# Patient Record
Sex: MALE | Race: WHITE | Employment: OTHER | URBAN - METROPOLITAN AREA
[De-identification: names, ages, dates, MRNs, and addresses within clinical notes are randomized per-mention and may not be internally consistent; named-entity substitution may affect disease eponyms.]

---

## 2017-11-16 PROBLEM — E11.3393 MODERATE NONPROLIFERATIVE DIABETIC RETINOPATHY OF BOTH EYES WITHOUT MACULAR EDEMA ASSOCIATED WITH TYPE 2 DIABETES MELLITUS (HCC): Status: ACTIVE | Noted: 2017-11-16

## 2019-03-21 PROBLEM — E11.65 UNCONTROLLED TYPE 2 DIABETES MELLITUS WITH HYPERGLYCEMIA (HCC): Status: ACTIVE | Noted: 2019-03-21

## 2019-10-09 PROCEDURE — 84156 ASSAY OF PROTEIN URINE: CPT | Performed by: INTERNAL MEDICINE

## 2019-10-09 PROCEDURE — 82570 ASSAY OF URINE CREATININE: CPT | Performed by: INTERNAL MEDICINE

## 2021-03-09 PROBLEM — J81.0 ACUTE PULMONARY EDEMA (HCC): Status: ACTIVE | Noted: 2021-03-09

## 2021-03-09 PROBLEM — Z99.2 ANEMIA DUE TO CHRONIC KIDNEY DISEASE, ON CHRONIC DIALYSIS (HCC): Status: ACTIVE | Noted: 2021-03-09

## 2021-03-09 PROBLEM — N18.9 ACUTE RENAL FAILURE SUPERIMPOSED ON CHRONIC KIDNEY DISEASE (HCC): Status: ACTIVE | Noted: 2021-03-01

## 2021-03-09 PROBLEM — Z99.2 ESRD (END STAGE RENAL DISEASE) ON DIALYSIS (HCC): Status: ACTIVE | Noted: 2021-03-09

## 2021-03-09 PROBLEM — D63.1 ANEMIA DUE TO CHRONIC KIDNEY DISEASE, ON CHRONIC DIALYSIS (HCC): Status: ACTIVE | Noted: 2021-03-09

## 2021-03-09 PROBLEM — N18.6 ESRD (END STAGE RENAL DISEASE) ON DIALYSIS (HCC): Status: ACTIVE | Noted: 2021-03-09

## 2021-03-09 PROBLEM — N17.9 ACUTE RENAL FAILURE SUPERIMPOSED ON CHRONIC KIDNEY DISEASE (HCC): Status: ACTIVE | Noted: 2021-03-01

## 2021-03-09 PROBLEM — N18.6 ANEMIA DUE TO CHRONIC KIDNEY DISEASE, ON CHRONIC DIALYSIS (HCC): Status: ACTIVE | Noted: 2021-03-09

## 2021-04-22 PROBLEM — I63.81 BASAL GANGLIA STROKE (HCC): Status: ACTIVE | Noted: 2021-04-19

## 2021-10-05 ENCOUNTER — LAB ENCOUNTER (OUTPATIENT)
Dept: LAB | Facility: HOSPITAL | Age: 61
End: 2021-10-05
Attending: SURGERY
Payer: COMMERCIAL

## 2021-10-05 DIAGNOSIS — N18.6 ESRD (END STAGE RENAL DISEASE) (HCC): ICD-10-CM

## 2021-10-08 ENCOUNTER — HOSPITAL ENCOUNTER (OUTPATIENT)
Dept: INTERVENTIONAL RADIOLOGY/VASCULAR | Facility: HOSPITAL | Age: 61
Discharge: HOME | End: 2021-10-08
Attending: SURGERY | Admitting: SURGERY
Payer: COMMERCIAL

## 2021-10-08 VITALS
RESPIRATION RATE: 14 BRPM | WEIGHT: 165 LBS | BODY MASS INDEX: 25 KG/M2 | DIASTOLIC BLOOD PRESSURE: 54 MMHG | HEART RATE: 71 BPM | SYSTOLIC BLOOD PRESSURE: 134 MMHG | OXYGEN SATURATION: 100 % | TEMPERATURE: 98 F

## 2021-10-08 DIAGNOSIS — N18.6 ESRD (END STAGE RENAL DISEASE) (HCC): Primary | ICD-10-CM

## 2021-10-08 PROCEDURE — 99153 MOD SED SAME PHYS/QHP EA: CPT

## 2021-10-08 PROCEDURE — 82962 GLUCOSE BLOOD TEST: CPT

## 2021-10-08 PROCEDURE — B50W1ZZ PLAIN RADIOGRAPHY OF DIALYSIS SHUNT/FISTULA USING LOW OSMOLAR CONTRAST: ICD-10-PCS | Performed by: SURGERY

## 2021-10-08 PROCEDURE — 99152 MOD SED SAME PHYS/QHP 5/>YRS: CPT

## 2021-10-08 PROCEDURE — 36902 INTRO CATH DIALYSIS CIRCUIT: CPT

## 2021-10-08 PROCEDURE — 76937 US GUIDE VASCULAR ACCESS: CPT

## 2021-10-08 PROCEDURE — 037Y3ZZ DILATION OF UPPER ARTERY, PERCUTANEOUS APPROACH: ICD-10-PCS | Performed by: SURGERY

## 2021-10-08 RX ORDER — LIDOCAINE HYDROCHLORIDE 10 MG/ML
INJECTION, SOLUTION INFILTRATION; PERINEURAL
Status: COMPLETED
Start: 2021-10-08 | End: 2021-10-08

## 2021-10-08 RX ORDER — HEPARIN SODIUM 5000 [USP'U]/ML
INJECTION, SOLUTION INTRAVENOUS; SUBCUTANEOUS
Status: COMPLETED
Start: 2021-10-08 | End: 2021-10-08

## 2021-10-08 RX ORDER — SODIUM CHLORIDE 9 MG/ML
INJECTION, SOLUTION INTRAVENOUS CONTINUOUS
Status: DISCONTINUED | OUTPATIENT
Start: 2021-10-08 | End: 2021-10-08

## 2021-10-08 RX ORDER — MIDAZOLAM HYDROCHLORIDE 1 MG/ML
INJECTION INTRAMUSCULAR; INTRAVENOUS
Status: COMPLETED
Start: 2021-10-08 | End: 2021-10-08

## 2021-10-08 RX ORDER — CLOPIDOGREL BISULFATE 75 MG/1
TABLET ORAL
Status: COMPLETED
Start: 2021-10-08 | End: 2021-10-08

## 2021-10-08 RX ORDER — NITROGLYCERIN 20 MG/100ML
INJECTION INTRAVENOUS
Status: COMPLETED
Start: 2021-10-08 | End: 2021-10-08

## 2021-10-08 RX ORDER — CLOPIDOGREL BISULFATE 75 MG/1
75 TABLET ORAL DAILY
Qty: 30 TABLET | Refills: 0 | Status: SHIPPED | OUTPATIENT
Start: 2021-10-08

## 2021-10-08 RX ADMIN — SODIUM CHLORIDE: 9 INJECTION, SOLUTION INTRAVENOUS at 08:45:00

## 2021-10-08 NOTE — H&P
Vascular Surgery  New Patient Consultation     Name: Masha Rudd  MRN: [id number]  : 10/27/1960     Referring Provider: Carlene Ramirez     CC: dialysis access malfunction     HPI:  63year old   male  with history of  HTN, HL DM who presented for AV fi Oral Tab, Take 1 tablet (5 mg total) by mouth daily. , Disp: 90 tablet, Rfl: 1  carvedilol 25 MG Oral Tab, Take 1 tablet (25 mg total) by mouth 2 (two) times daily. , Disp: 180 tablet, Rfl: 1  lisinopril 40 MG Oral Tab, Take 1 tablet (40 mg total) by mouth d since quittin.1      Smokeless tobacco: Never Used      Tobacco comment: 14 cigarettes a day    Substance and Sexual Activity      Alcohol use:  Yes        Alcohol/week: 0.0 standard drinks      Drug use: No      Sexual activity: Not Currently       for a right upper extremity fistulogram with the possibility for intervention. I discussed the risk and benefits. He affirms his understanding and agrees to proceed. Consent obtained. Proceed to Cath Lab as above.   This was communicated with his brothe

## 2021-10-08 NOTE — OPERATIVE REPORT
Vascular Surgery Op Note  Patients Name:  Bright Tubbs   Operating Physician: Marlene Fontaine MD  CSN:    [id number]                                                         FLO:  JAY  MRN:   [id number]   Buster Morris the patient was brought to the Cath Lab and placed in supine position. Sedation was initiated without any issues. The patient was subsequently prepped and draped in the usual sterile fashion. Timeout was performed.   With the use the ultrasound the right

## 2021-10-08 NOTE — PROGRESS NOTES
Pt is able to sit up and ambulate without difficulty. Pt's vital signs are stable. Right arm/fistula procedural access site is dry and intact with no signs and symptoms of bleeding or hematoma. Pt tolerated food/fluids.  Dr. Stephane Vera spoke to pt/family post

## 2022-03-24 PROBLEM — N17.9 ACUTE RENAL FAILURE SUPERIMPOSED ON CHRONIC KIDNEY DISEASE (HCC): Status: RESOLVED | Noted: 2021-03-01 | Resolved: 2022-03-24

## 2022-03-24 PROBLEM — N18.9 ACUTE RENAL FAILURE SUPERIMPOSED ON CHRONIC KIDNEY DISEASE (HCC): Status: RESOLVED | Noted: 2021-03-01 | Resolved: 2022-03-24

## 2022-03-24 PROBLEM — J81.0 ACUTE PULMONARY EDEMA (HCC): Status: RESOLVED | Noted: 2021-03-09 | Resolved: 2022-03-24

## 2025-01-04 ENCOUNTER — HOSPITAL ENCOUNTER (INPATIENT)
Facility: HOSPITAL | Age: 65
LOS: 6 days | Discharge: HOSPICE | End: 2025-01-10
Attending: STUDENT IN AN ORGANIZED HEALTH CARE EDUCATION/TRAINING PROGRAM | Admitting: STUDENT IN AN ORGANIZED HEALTH CARE EDUCATION/TRAINING PROGRAM
Payer: MEDICARE

## 2025-01-04 ENCOUNTER — APPOINTMENT (OUTPATIENT)
Dept: CT IMAGING | Facility: HOSPITAL | Age: 65
End: 2025-01-04
Attending: STUDENT IN AN ORGANIZED HEALTH CARE EDUCATION/TRAINING PROGRAM
Payer: MEDICARE

## 2025-01-04 ENCOUNTER — APPOINTMENT (OUTPATIENT)
Dept: GENERAL RADIOLOGY | Facility: HOSPITAL | Age: 65
End: 2025-01-04
Attending: STUDENT IN AN ORGANIZED HEALTH CARE EDUCATION/TRAINING PROGRAM
Payer: MEDICARE

## 2025-01-04 DIAGNOSIS — R53.1 WEAKNESS GENERALIZED: ICD-10-CM

## 2025-01-04 DIAGNOSIS — R62.7 FTT (FAILURE TO THRIVE) IN ADULT: ICD-10-CM

## 2025-01-04 DIAGNOSIS — G93.49: ICD-10-CM

## 2025-01-04 DIAGNOSIS — B97.89: ICD-10-CM

## 2025-01-04 DIAGNOSIS — R94.01 EEG ABNORMALITY WITHOUT SEIZURE: Primary | ICD-10-CM

## 2025-01-04 PROBLEM — G93.40 ENCEPHALOPATHY: Status: ACTIVE | Noted: 2025-01-04

## 2025-01-04 LAB
ALBUMIN SERPL-MCNC: 3.7 G/DL (ref 3.2–4.8)
ALBUMIN/GLOB SERPL: 1.2 {RATIO} (ref 1–2)
ALP LIVER SERPL-CCNC: 136 U/L
ALT SERPL-CCNC: <7 U/L
ANION GAP SERPL CALC-SCNC: 9 MMOL/L (ref 0–18)
AST SERPL-CCNC: 17 U/L (ref ?–34)
BASOPHILS # BLD AUTO: 0.04 X10(3) UL (ref 0–0.2)
BASOPHILS NFR BLD AUTO: 0.6 %
BILIRUB SERPL-MCNC: 0.2 MG/DL (ref 0.2–1.1)
BUN BLD-MCNC: 31 MG/DL (ref 9–23)
CALCIUM BLD-MCNC: 9.7 MG/DL (ref 8.7–10.4)
CHLORIDE SERPL-SCNC: 92 MMOL/L (ref 98–112)
CO2 SERPL-SCNC: 31 MMOL/L (ref 21–32)
CREAT BLD-MCNC: 5.36 MG/DL
EGFRCR SERPLBLD CKD-EPI 2021: 11 ML/MIN/1.73M2 (ref 60–?)
EOSINOPHIL # BLD AUTO: 0.08 X10(3) UL (ref 0–0.7)
EOSINOPHIL NFR BLD AUTO: 1.1 %
ERYTHROCYTE [DISTWIDTH] IN BLOOD BY AUTOMATED COUNT: 15.3 %
FLUAV + FLUBV RNA SPEC NAA+PROBE: NEGATIVE
FLUAV + FLUBV RNA SPEC NAA+PROBE: NEGATIVE
GLOBULIN PLAS-MCNC: 3 G/DL (ref 2–3.5)
GLUCOSE BLD-MCNC: 151 MG/DL (ref 70–99)
HCT VFR BLD AUTO: 23.7 %
HGB BLD-MCNC: 8.3 G/DL
IMM GRANULOCYTES # BLD AUTO: 0.03 X10(3) UL (ref 0–1)
IMM GRANULOCYTES NFR BLD: 0.4 %
LACTATE SERPL-SCNC: 0.9 MMOL/L (ref 0.5–2)
LYMPHOCYTES # BLD AUTO: 0.95 X10(3) UL (ref 1–4)
LYMPHOCYTES NFR BLD AUTO: 13.6 %
MAGNESIUM SERPL-MCNC: 2.2 MG/DL (ref 1.6–2.6)
MCH RBC QN AUTO: 32.2 PG (ref 26–34)
MCHC RBC AUTO-ENTMCNC: 35 G/DL (ref 31–37)
MCV RBC AUTO: 91.9 FL
MONOCYTES # BLD AUTO: 0.55 X10(3) UL (ref 0.1–1)
MONOCYTES NFR BLD AUTO: 7.9 %
NEUTROPHILS # BLD AUTO: 5.35 X10 (3) UL (ref 1.5–7.7)
NEUTROPHILS # BLD AUTO: 5.35 X10(3) UL (ref 1.5–7.7)
NEUTROPHILS NFR BLD AUTO: 76.4 %
OSMOLALITY SERPL CALC.SUM OF ELEC: 283 MOSM/KG (ref 275–295)
PHOSPHATE SERPL-MCNC: 3.2 MG/DL (ref 2.4–5.1)
PLATELET # BLD AUTO: 237 10(3)UL (ref 150–450)
POTASSIUM SERPL-SCNC: 4.6 MMOL/L (ref 3.5–5.1)
PROT SERPL-MCNC: 6.7 G/DL (ref 5.7–8.2)
RBC # BLD AUTO: 2.58 X10(6)UL
RSV RNA SPEC NAA+PROBE: POSITIVE
SARS-COV-2 RNA RESP QL NAA+PROBE: NOT DETECTED
SODIUM SERPL-SCNC: 132 MMOL/L (ref 136–145)
WBC # BLD AUTO: 7 X10(3) UL (ref 4–11)

## 2025-01-04 PROCEDURE — 71045 X-RAY EXAM CHEST 1 VIEW: CPT | Performed by: STUDENT IN AN ORGANIZED HEALTH CARE EDUCATION/TRAINING PROGRAM

## 2025-01-04 PROCEDURE — 70450 CT HEAD/BRAIN W/O DYE: CPT | Performed by: STUDENT IN AN ORGANIZED HEALTH CARE EDUCATION/TRAINING PROGRAM

## 2025-01-04 RX ORDER — HEPARIN SODIUM 5000 [USP'U]/ML
5000 INJECTION, SOLUTION INTRAVENOUS; SUBCUTANEOUS EVERY 12 HOURS SCHEDULED
Status: DISCONTINUED | OUTPATIENT
Start: 2025-01-04 | End: 2025-01-10

## 2025-01-04 RX ORDER — ONDANSETRON 2 MG/ML
4 INJECTION INTRAMUSCULAR; INTRAVENOUS ONCE
Status: COMPLETED | OUTPATIENT
Start: 2025-01-04 | End: 2025-01-04

## 2025-01-04 RX ORDER — ACETAMINOPHEN 500 MG
500 TABLET ORAL EVERY 4 HOURS PRN
Status: DISCONTINUED | OUTPATIENT
Start: 2025-01-04 | End: 2025-01-10

## 2025-01-04 RX ORDER — ONDANSETRON 2 MG/ML
2 INJECTION INTRAMUSCULAR; INTRAVENOUS ONCE
Status: COMPLETED | OUTPATIENT
Start: 2025-01-04 | End: 2025-01-04

## 2025-01-04 RX ORDER — ONDANSETRON 2 MG/ML
INJECTION INTRAMUSCULAR; INTRAVENOUS
Status: COMPLETED
Start: 2025-01-04 | End: 2025-01-04

## 2025-01-04 NOTE — ED INITIAL ASSESSMENT (HPI)
Patient arrived via EMS from [facility] for n/v/d/fever. Medics said they suctioned him and gave 4 mg zofran.

## 2025-01-05 LAB
ALBUMIN SERPL-MCNC: 3.7 G/DL (ref 3.2–4.8)
ALBUMIN/GLOB SERPL: 1.3 {RATIO} (ref 1–2)
ALP LIVER SERPL-CCNC: 141 U/L
ALT SERPL-CCNC: <7 U/L
ANION GAP SERPL CALC-SCNC: 9 MMOL/L (ref 0–18)
AST SERPL-CCNC: 13 U/L (ref ?–34)
ATRIAL RATE: 59 BPM
BILIRUB SERPL-MCNC: 0.3 MG/DL (ref 0.2–1.1)
BILIRUB UR QL STRIP.AUTO: NEGATIVE
BUN BLD-MCNC: 37 MG/DL (ref 9–23)
CALCIUM BLD-MCNC: 9.8 MG/DL (ref 8.7–10.4)
CHLORIDE SERPL-SCNC: 94 MMOL/L (ref 98–112)
CO2 SERPL-SCNC: 30 MMOL/L (ref 21–32)
COLOR UR AUTO: YELLOW
CREAT BLD-MCNC: 6.17 MG/DL
EGFRCR SERPLBLD CKD-EPI 2021: 9 ML/MIN/1.73M2 (ref 60–?)
ERYTHROCYTE [DISTWIDTH] IN BLOOD BY AUTOMATED COUNT: 15.6 %
GLOBULIN PLAS-MCNC: 2.9 G/DL (ref 2–3.5)
GLUCOSE BLD-MCNC: 127 MG/DL (ref 70–99)
GLUCOSE BLD-MCNC: 139 MG/DL (ref 70–99)
GLUCOSE BLD-MCNC: 145 MG/DL (ref 70–99)
GLUCOSE BLD-MCNC: 147 MG/DL (ref 70–99)
GLUCOSE BLD-MCNC: 163 MG/DL (ref 70–99)
GLUCOSE UR STRIP.AUTO-MCNC: 100 MG/DL
HCT VFR BLD AUTO: 25.1 %
HGB BLD-MCNC: 8.6 G/DL
KETONES UR STRIP.AUTO-MCNC: NEGATIVE MG/DL
LEUKOCYTE ESTERASE UR QL STRIP.AUTO: NEGATIVE
MCH RBC QN AUTO: 31.9 PG (ref 26–34)
MCHC RBC AUTO-ENTMCNC: 34.3 G/DL (ref 31–37)
MCV RBC AUTO: 93 FL
NITRITE UR QL STRIP.AUTO: NEGATIVE
OSMOLALITY SERPL CALC.SUM OF ELEC: 286 MOSM/KG (ref 275–295)
P AXIS: 42 DEGREES
P-R INTERVAL: 196 MS
PH UR STRIP.AUTO: 8.5 [PH] (ref 5–8)
PLATELET # BLD AUTO: 233 10(3)UL (ref 150–450)
POTASSIUM SERPL-SCNC: 4.4 MMOL/L (ref 3.5–5.1)
PROT SERPL-MCNC: 6.6 G/DL (ref 5.7–8.2)
PROT UR STRIP.AUTO-MCNC: 300 MG/DL
Q-T INTERVAL: 426 MS
QRS DURATION: 104 MS
QTC CALCULATION (BEZET): 421 MS
R AXIS: -2 DEGREES
RBC # BLD AUTO: 2.7 X10(6)UL
RBC UR QL AUTO: NEGATIVE
SODIUM SERPL-SCNC: 133 MMOL/L (ref 136–145)
SP GR UR STRIP.AUTO: 1.01 (ref 1–1.03)
T AXIS: 37 DEGREES
UROBILINOGEN UR STRIP.AUTO-MCNC: NORMAL MG/DL
VENTRICULAR RATE: 59 BPM
WBC # BLD AUTO: 13.4 X10(3) UL (ref 4–11)

## 2025-01-05 RX ORDER — ATORVASTATIN CALCIUM 80 MG/1
80 TABLET, FILM COATED ORAL NIGHTLY
Status: DISCONTINUED | OUTPATIENT
Start: 2025-01-05 | End: 2025-01-10

## 2025-01-05 RX ORDER — CLOPIDOGREL BISULFATE 75 MG/1
75 TABLET ORAL DAILY
Status: DISCONTINUED | OUTPATIENT
Start: 2025-01-05 | End: 2025-01-10

## 2025-01-05 RX ORDER — ASPIRIN 81 MG/1
81 TABLET ORAL DAILY
Status: DISCONTINUED | OUTPATIENT
Start: 2025-01-05 | End: 2025-01-10

## 2025-01-05 RX ORDER — CALCITRIOL 0.25 UG/1
0.25 CAPSULE, LIQUID FILLED ORAL
Status: DISCONTINUED | OUTPATIENT
Start: 2025-01-06 | End: 2025-01-10

## 2025-01-05 RX ORDER — ALBUMIN (HUMAN) 12.5 G/50ML
25 SOLUTION INTRAVENOUS
Status: DISPENSED | OUTPATIENT
Start: 2025-01-05 | End: 2025-01-07

## 2025-01-05 NOTE — ED QUICK NOTES
Orders for admission, patient is aware of plan and ready to go upstairs. Any questions, please call ED RN Eldon at extension [phone].     Patient Covid vaccination status: Fully vaccinated     COVID Test Ordered in ED: SARS-CoV-2/Flu A and B/RSV by PCR (GeneXpert)    COVID Suspicion at Admission: N/A    Running Infusions:  None    Mental Status/LOC at time of transport: A&O x 0    Other pertinent information:   CIWA score: N/A   NIH score:  N/A

## 2025-01-05 NOTE — ED QUICK NOTES
Rounding Completed    Plan of Care reviewed. Waiting for further orders.  Elimination needs assessed.  Vitals rechecked and recorded, family at bedside.  A&O x O    Bed is locked and in lowest position. Call light within reach.

## 2025-01-05 NOTE — H&P
[facility] Hospitalist H&P    No chief complaint on file.     PCP: April Simmons MD    History of Present Illness: Patient is a 64 year old male with history of ESRD on HD, hypertension, hyperlipidemia, type 2 diabetes, and CVA who presented for altered mentation.  Reportedly, patient has not been eating or following commands per baseline.  Reportedly, due to CVA complication he is bedbound and nonverbal at baseline per patients POA.  He does open eyes to verbal cues.     Patient hypertensive on presentation, satting well on room air.  Labs with leukocytosis and renal function consistent with ESRD.  RSV positive.  CXR without acute infiltrates and CT head without acute intracranial process.  Patient admitted for further management.    Medical History:  Past Medical History:    Acute pulmonary edema (HCC)    DIABETES    HTN (hypertension), benign    HYPERLIPIDEMIA    Stroke (HCC)    basil ganglia stroke    Tobacco use disorder      Past Surgical History:   Procedure Laterality Date    Cataract Right 04/12/2018    Tej Trent IOL    Cataract Left 05/10/2018    Tej Trent IOL    Other  1990    left foot surgery      Social History     Tobacco Use    Smoking status: Former     Current packs/day: 0.00     Types: Cigarettes     Quit date: 3/1/2021     Years since quitting: 3.8    Smokeless tobacco: Never    Tobacco comments:     14 cigarettes a day   Substance Use Topics    Alcohol use: Yes     Alcohol/week: 0.0 standard drinks of alcohol      Family History   Problem Relation Age of Onset    Diabetes Father     Hypertension Father     Diabetes Mother        Allergies[1]     Review of Systems  Comprehensive ROS reviewed and negative except for what is stated in HPI.      OBJECTIVE:  /65 (BP Location: Radial)   Pulse 68   Temp 99.5 °F (37.5 °C) (Axillary)   Resp 16   Wt 169 lb 12.1 oz (77 kg)   SpO2 95%   BMI 25.81 kg/m²   General: No apparent distress.   HEENT:  EOMI, PERRLA.  Pulm: Clear breath  sounds bilaterally.  Normal respiratory effort.  CV: Regular rate and rhythm, no murmur.   Abd: Soft, nontender, nondistended.  MSK: No obvious deformities.    Skin: No lesions or rashes.  Neuro: Nonverbal, does not follow commands.    LABS:   Lab Results   Component Value Date    WBC 13.4 01/05/2025    HGB 8.6 01/05/2025    HCT 25.1 01/05/2025    .0 01/05/2025    CREATSERUM 6.17 01/05/2025    BUN 37 01/05/2025     01/05/2025    K 4.4 01/05/2025    CL 94 01/05/2025    CO2 30.0 01/05/2025     01/05/2025    CA 9.8 01/05/2025    ALB 3.7 01/05/2025    ALKPHO 141 01/05/2025    BILT 0.3 01/05/2025    TP 6.6 01/05/2025    AST 13 01/05/2025    ALT <7 01/05/2025    MG 2.2 01/04/2025    PHOS 3.2 01/04/2025    PGLU 147 01/05/2025       All lab work and imaging personally reviewed.    Assessment/ Plan:     #RSV  #Acute hypoxia with mucus plugging  #Leukocytosis   #Acute metabolic encephalopathy  -CXR without infiltrates.  CT head negative.  -Supportive therapy,  nebs, bronchial hygiene.  -Will maintain patient NPO for now due to mentation.    #Hypertension  #Hyperlipidemia  #ESRD on HD  #Type 2 diabetes  -Holding home PO until mentation improves.  Discussed with bedside RN.  -Sliding scale coverage as patient is with minimal oral intake.  -Nephrology on consult for HD management    #Advanced care planning  -Discussed with patients POA/ Maria Del Rosario. Updated code status to DNAR / DNI.    DVT ppx: Heparin subcu  Diet: NPO   Code status: DNAR/ DNI  Disposition: Likely return to rehab    DO Fátima Sutton [facility]       [1] No Known Allergies

## 2025-01-05 NOTE — ED QUICK NOTES
Rounding Completed    Plan of Care reviewed. Waiting for further orders.  Elimination needs assessed.  Vitals rechecked and recorded, patient A&O x 0 per report from previous shift  Family at bedside    Bed is locked and in lowest position. Call light within reach.

## 2025-01-05 NOTE — ED PROVIDER NOTES
History   No chief complaint on file.      HPI    64 year old male with history of hypertension, hyperlipidemia, diabetes, basal ganglia CVA complicated by communication deficit, bedbound, ESRD on hemodialysis 4 days a week presents from facility for assessment.  For the past couple days patient has been minimally responsive, not eating or drinking, not opening his eyes and not saying anything.  Noted fever at the facility today.  No reports of cough or fevers or vomiting.  Patient unable to give any history.  History per family at bedside.  Family reports that at baseline he is able to speak a few words and can eat and drink, has not been able to the past couple days.           Past Medical History:    Acute pulmonary edema (HCC)    DIABETES    HTN (hypertension), benign    HYPERLIPIDEMIA    Stroke (HCC)    basil ganglia stroke    Tobacco use disorder       Past Surgical History:   Procedure Laterality Date    Cataract Right 04/12/2018    Tej Trent IOL    Cataract Left 05/10/2018    Tej Trent IOL    Other  1990    left foot surgery       Social History     Socioeconomic History    Marital status: Single    Number of children: 0   Occupational History    Occupation:    Tobacco Use    Smoking status: Former     Current packs/day: 0.00     Types: Cigarettes     Quit date: 3/1/2021     Years since quitting: 3.8    Smokeless tobacco: Never    Tobacco comments:     14 cigarettes a day   Substance and Sexual Activity    Alcohol use: Yes     Alcohol/week: 0.0 standard drinks of alcohol    Drug use: No    Sexual activity: Not Currently   Social History Narrative    . Single.                   Physical Exam     ED Triage Vitals [01/04/25 1719]   /62   Pulse 60   Resp 18   Temp 99.6 °F (37.6 °C)   Temp src Rectal   SpO2 100 %   O2 Device None (Room air)       Physical Exam  Constitutional:       Appearance: He is ill-appearing.      Comments: Will intermittently move about to pain  stimuli   HENT:      Head: Normocephalic and atraumatic.      Mouth/Throat:      Mouth: Mucous membranes are dry.   Eyes:      Extraocular Movements: Extraocular movements intact.   Cardiovascular:      Rate and Rhythm: Normal rate and regular rhythm.      Pulses: Normal pulses.   Pulmonary:      Effort: Pulmonary effort is normal. No respiratory distress.      Comments: Coarse breath sounds bilaterally  Musculoskeletal:      Cervical back: Normal range of motion.              ED Course     Labs Reviewed   COMP METABOLIC PANEL (14) - Abnormal; Notable for the following components:       Result Value    Glucose 151 (*)     Sodium 132 (*)     Chloride 92 (*)     BUN 31 (*)     Creatinine 5.36 (*)     eGFR-Cr 11 (*)     ALT <7 (*)     Alkaline Phosphatase 136 (*)     All other components within normal limits   CBC WITH DIFFERENTIAL WITH PLATELET - Abnormal; Notable for the following components:    RBC 2.58 (*)     HGB 8.3 (*)     HCT 23.7 (*)     Lymphocyte Absolute 0.95 (*)     All other components within normal limits   SARS-COV-2/FLU A AND B/RSV BY PCR (GENEXPERT) - Abnormal; Notable for the following components:    RSV by PCR Positive (*)     All other components within normal limits    Narrative:     This test is intended for the qualitative detection and differentiation of SARS-CoV-2, influenza A, influenza B, and respiratory syncytial virus (RSV) viral RNA in nasopharyngeal or nares swabs from individuals suspected of respiratory viral infection consistent with COVID-19 by their healthcare provider. Signs and symptoms of respiratory viral infection due to SARS-CoV-2, influenza, and RSV can be similar.    Test performed using the Xpert Xpress SARS-CoV-2/FLU/RSV (real time RT-PCR)  assay on the [employer] instrument, [employer], [place] [address].   This test is being used under the Food and Drug Administration's Emergency Use Authorization.    The authorized Fact Sheet for Healthcare Providers for this assay is  available upon request from the laboratory.   LACTIC ACID, PLASMA - Normal   MAGNESIUM - Normal   PHOSPHORUS - Normal   RAINBOW DRAW LAVENDER   RAINBOW DRAW LIGHT GREEN   RAINBOW DRAW BLUE   BLOOD CULTURE   BLOOD CULTURE     XR CHEST AP PORTABLE  (CPT=71045)    Result Date: 1/4/2025  CONCLUSION:  Normal cardiac and mediastinal contours.  No pulmonary edema or focal airspace consolidation.  The pleural spaces are clear.   LOCATION:  [facility]      Dictated by (CST): Alec Oliva MD on 1/04/2025 at 7:08 PM     Finalized by (CST): Alec Oliva MD on 1/04/2025 at 7:09 PM       CT BRAIN OR HEAD (CPT=70450)    Result Date: 1/4/2025  CONCLUSION:   No acute intracranial process identified.    LOCATION:  [facility]   Dictated by (CST): Alec Oliva MD on 1/04/2025 at 6:55 PM     Finalized by (CST): Alec Oliva MD on 1/04/2025 at 6:58 PM            MDM     Vitals:    01/04/25 1719 01/04/25 1900 01/04/25 1922 01/04/25 2018   BP: 153/62 148/54 (!) 165/56 148/59   Pulse: 60 54 56 55   Resp: 18 11 15 14   Temp: 99.6 °F (37.6 °C)      TempSrc: Rectal      SpO2: 100% 100% 100% 100%   Weight: 77 kg          Altered mentation, failure to thrive.  Poor intake given patient is minimally responsive on examination here.  No immediate airway concerns but will monitor closely.  Hemodynamically reassuring at this time.  Concern for infectious versus metabolic encephalopathy.    ED Course as of 01/04/25 2057  ------------------------------------------------------------  Time: 01/04 1902  Comment: My interpretation of CT head without hemorrhage, confirmed by radiology.  ------------------------------------------------------------  Time: 01/04 1947  Comment: Elevated creatinine consistent with ESRD, reassuring potassium and no acidosis.  Lactate is reassuring, magnesium within normal limits.  RSV is positive.  ------------------------------------------------------------  Time: 01/04 1947  Comment: Chest x-ray is  clear.  ------------------------------------------------------------  Time: 01/04 1955  Comment: No acute intervention at this time, patient will need to be monitored, given his clinical appearance will admit for further observation and care.         Disposition and Plan     Clinical Impression:  1. Encephalopathy due to other virus    2. Weakness generalized    3. FTT (failure to thrive) in adult        Disposition:  Admit    Follow-up:  No follow-up provider specified.    Medications Prescribed:  Current Discharge Medication List          Hospital Problems       Present on Admission  Date Reviewed: 3/24/2022            ICD-10-CM Noted POA    Encephalopathy G93.40 1/4/2025 Unknown

## 2025-01-05 NOTE — SPIRITUAL CARE NOTE
Spiritual Care Visit Note    Patient Name: Bg Watkins Date of Spiritual Care Visit: 25   : 10/27/1960 Primary Dx: Encephalopathy due to other virus       Referred By: Referral From: Nurse    Spiritual Care Taxonomy:    Intended Effects: Mirella affirmation    Methods: Assist with spiritual/Rastafari practices;Collaborate with care team member;Offer spiritual/Rastafari support;Offer emotional support;Encourage sharing of feelings    Interventions: Acknowledge response to difficult experience;Active listening;Connect someone with their mirella community/clergy;Explain  role;Identify supportive relationship(s);[place]    Visit Type/Summary:     - Spiritual Care: Responded to a request for spiritual care and assessed the patient for spiritual care needs. Consulted with RN prior to visit. Visited with patients sisters at bedside. Offered empathic listening and emotional support. Family expressed appreciation for  visit. Provided information regarding how to contact Spiritual Care and left a Spiritual Care information card. Provided support for Patient's spiritual/Rastafari requests. Coordinated  visit for Sacrament of the Sick.  remains available for follow up.    Spiritual Care support can be requested via an Epic consult. For urgent/immediate needs, please contact the On Call  at: [facility]: ext [phone]    Rev Jada Carranza MA  Chaplain Resident  [phone]

## 2025-01-05 NOTE — PLAN OF CARE
NURSING ADMISSION NOTE    Patient admitted via Cart  Oriented to room.  Safety precautions initiated.  Bed in low position.  Call light in reach.    Pt received from ER. Pt from [facility], [facility]. Hx of CVA w/ communication deficit (not really verbal per family, baseline). On arrival, pt opens eyes, nonverbal. N/V. Thick secretions. Pt unable to cough up secretions. Suction prn. Aspiration precautions. VSS on RA. Afebrile. MD updated about respiratory status. UA sent. R arm precautions d/t R AVF.     Fall risk protocol followed, call light within reach.

## 2025-01-05 NOTE — SPIRITUAL CARE NOTE
Spiritual Care Visit Note    Patient Name: Bg Watkins Date of Spiritual Care Visit: 25   : 10/27/1960 Primary Dx: Encephalopathy due to other virus       Referred By: Referral From: Nurse    Spiritual Care Taxonomy:    Intended Effects: Mirella affirmation    Methods: Assist with spiritual/Buddhism practices    Interventions: Connect someone with their mirella community/clergy    Visit Type/Summary:     - Spiritual Care: Provided support for Patient's spiritual/Buddhism requests. Coordinated  visit for Sacrament of the Sick. Father Gregorio from [place] anointed patient.  remains available for follow up.    Spiritual Care support can be requested via an Epic consult. For urgent/immediate needs, please contact the On Call  at: [facility]: ext [phone]    Rev. Jada Carranza MA  Chaplain Resident

## 2025-01-06 PROBLEM — Z51.5 PALLIATIVE CARE ENCOUNTER: Status: ACTIVE | Noted: 2025-01-06

## 2025-01-06 PROBLEM — Z71.89 COUNSELING REGARDING ADVANCE CARE PLANNING AND GOALS OF CARE: Status: ACTIVE | Noted: 2025-01-06

## 2025-01-06 LAB
ALBUMIN SERPL-MCNC: 3.8 G/DL (ref 3.2–4.8)
ALBUMIN/GLOB SERPL: 1.1 {RATIO} (ref 1–2)
ALP LIVER SERPL-CCNC: 147 U/L
ALT SERPL-CCNC: <7 U/L
ANION GAP SERPL CALC-SCNC: 13 MMOL/L (ref 0–18)
AST SERPL-CCNC: 14 U/L (ref ?–34)
BILIRUB SERPL-MCNC: 0.4 MG/DL (ref 0.2–1.1)
BUN BLD-MCNC: 59 MG/DL (ref 9–23)
CALCIUM BLD-MCNC: 9.7 MG/DL (ref 8.7–10.4)
CHLORIDE SERPL-SCNC: 97 MMOL/L (ref 98–112)
CO2 SERPL-SCNC: 27 MMOL/L (ref 21–32)
CREAT BLD-MCNC: 8.1 MG/DL
DEPRECATED HBV CORE AB SER IA-ACNC: 1681 NG/ML
EGFRCR SERPLBLD CKD-EPI 2021: 7 ML/MIN/1.73M2 (ref 60–?)
ERYTHROCYTE [DISTWIDTH] IN BLOOD BY AUTOMATED COUNT: 16 %
GLOBULIN PLAS-MCNC: 3.4 G/DL (ref 2–3.5)
GLUCOSE BLD-MCNC: 148 MG/DL (ref 70–99)
GLUCOSE BLD-MCNC: 150 MG/DL (ref 70–99)
GLUCOSE BLD-MCNC: 155 MG/DL (ref 70–99)
GLUCOSE BLD-MCNC: 164 MG/DL (ref 70–99)
HBV SURFACE AG SER-ACNC: <0.1 [IU]/L
HBV SURFACE AG SERPL QL IA: NONREACTIVE
HCT VFR BLD AUTO: 27.2 %
HGB BLD-MCNC: 9.2 G/DL
IRON SATN MFR SERPL: 13 %
IRON SERPL-MCNC: 23 UG/DL
MCH RBC QN AUTO: 31.3 PG (ref 26–34)
MCHC RBC AUTO-ENTMCNC: 33.8 G/DL (ref 31–37)
MCV RBC AUTO: 92.5 FL
OSMOLALITY SERPL CALC.SUM OF ELEC: 303 MOSM/KG (ref 275–295)
PHOSPHATE SERPL-MCNC: 2.8 MG/DL (ref 2.4–5.1)
PLATELET # BLD AUTO: 214 10(3)UL (ref 150–450)
POTASSIUM SERPL-SCNC: 5 MMOL/L (ref 3.5–5.1)
PROT SERPL-MCNC: 7.2 G/DL (ref 5.7–8.2)
RBC # BLD AUTO: 2.94 X10(6)UL
SODIUM SERPL-SCNC: 137 MMOL/L (ref 136–145)
TOTAL IRON BINDING CAPACITY: 172 UG/DL (ref 250–425)
TRANSFERRIN SERPL-MCNC: 113 MG/DL (ref 215–365)
WBC # BLD AUTO: 11 X10(3) UL (ref 4–11)

## 2025-01-06 PROCEDURE — 99221 1ST HOSP IP/OBS SF/LOW 40: CPT | Performed by: NURSE PRACTITIONER

## 2025-01-06 PROCEDURE — 5A1D70Z PERFORMANCE OF URINARY FILTRATION, INTERMITTENT, LESS THAN 6 HOURS PER DAY: ICD-10-PCS | Performed by: INTERNAL MEDICINE

## 2025-01-06 NOTE — PLAN OF CARE
Patient opens eyes to stimuli, non-verbal, room air with congestion, cough, oral suctioning as needed, abdomen soft, bowel sounds present, primofit for urinary output, appears to be comfortable, IV site saline locked, repositioning, fall and aspiration precautions, patient not able to tolerate PO at this time due to cognitive deficits, lethargy.    0540-Patient febrile temp 102.7, ice packs applied and temp down 100.

## 2025-01-06 NOTE — OCCUPATIONAL THERAPY NOTE
OT order received chart reviewed. Patient is currently receiving dialysis. Will follow up at a later date.

## 2025-01-06 NOTE — PROGRESS NOTES
[facility]   part of [facility]    Nephrology Progress Note    Bg Watkins Attending:  Duran Redding MD     Cc: esrd    SUBJECTIVE     Fever to 102.7 noted   Sleeping, no complaints noted     PHYSICAL EXAM   Vital signs: /67 (BP Location: Left arm)   Pulse 98   Temp (!) 102.7 °F (39.3 °C) (Axillary)   Resp 18   Wt 169 lb 12.1 oz (77 kg)   SpO2 91%   BMI 25.81 kg/m²   Temp (24hrs), Av.4 °F (37.4 °C), Min:98.1 °F (36.7 °C), Max:102.7 °F (39.3 °C)     No intake or output data in the 24 hours ending 25 1048  Wt Readings from Last 3 Encounters:   25 169 lb 12.1 oz (77 kg)   22 169 lb 12.8 oz (77 kg)   21 167 lb 12.8 oz (76.1 kg)     General: NAD  HEENT: NCAT, MMM  Neck: Supple   Cardiac: Regular rate and rhythm   Lungs: Coarse  Abdomen: Soft, non-tender, nondistended   Extremities: No edema  Neurologic: No asterxis  Skin: Warm and dry, no rashes     MEDS     Current Facility-Administered Medications   Medication Dose Route Frequency    insulin aspart (NovoLOG) 100 Units/mL FlexPen 1-5 Units  1-5 Units Subcutaneous TID AC and HS    aspirin DR tab 81 mg  81 mg Oral Daily    atorvastatin (Lipitor) tab 80 mg  80 mg Oral Nightly    calcitriol (Rocaltrol) cap 0.25 mcg  0.25 mcg Oral Once per day on     clopidogrel (Plavix) tab 75 mg  75 mg Oral Daily    sodium chloride 0.9 % IV bolus 100 mL  100 mL Intravenous Q30 Min PRN    And    albumin human (Albumin) 25% injection 25 g  25 g Intravenous PRN Dialysis    acetaminophen (Tylenol Extra Strength) tab 500 mg  500 mg Oral Q4H PRN    melatonin tab 3 mg  3 mg Oral Nightly PRN    heparin (Porcine) 5000 UNIT/ML injection 5,000 Units  5,000 Units Subcutaneous 2 times per day       LABS     Lab Results   Component Value Date    WBC 11.0 2025    HGB 9.2 2025    HCT 27.2 2025    .0 2025    CREATSERUM 8.10 2025    BUN 59 2025     2025    K 5.0 2025     CL 97 01/06/2025    CO2 27.0 01/06/2025     01/06/2025    CA 9.7 01/06/2025    ALB 3.8 01/06/2025    ALKPHO 147 01/06/2025    BILT 0.4 01/06/2025    TP 7.2 01/06/2025    AST 14 01/06/2025    ALT <7 01/06/2025    PHOS 2.8 01/06/2025    PGLU 164 01/06/2025       IMAGING   All imaging studies personally reviewed.    XR CHEST AP PORTABLE  (CPT=71045)   Final Result   PROCEDURE:  XR CHEST AP PORTABLE  (CPT=71045)       TECHNIQUE:  AP chest radiograph was obtained.       COMPARISON:  None.       INDICATIONS:  n/v/f       PATIENT STATED HISTORY: (As transcribed by Technologist)  Patient arrived    via EMS from [facility] for n/v/d/fever.            FINDINGS:                           =====   CONCLUSION:  Normal cardiac and mediastinal contours.  No pulmonary edema    or focal airspace consolidation.  The pleural spaces are clear.           LOCATION:  [facility]                       Dictated by (CST): Alec Oliva MD on 1/04/2025 at 7:08 PM        Finalized by (CST): Alec Oliva MD on 1/04/2025 at 7:09 PM         CT BRAIN OR HEAD (CPT=70450)   Final Result   PROCEDURE:  CT BRAIN OR HEAD (00981)       COMPARISON:  None.       INDICATIONS:  AMS       TECHNIQUE:  Noncontrast CT scanning is performed through the brain. Dose    reduction techniques were used. Dose information is transmitted to the ACR    (American College of Radiology) NRDR (National Radiology Data Registry)    which includes the Dose Index    Registry.       PATIENT STATED HISTORY: (As transcribed by Technologist)  Pt with altered    mental status nausea and vomiting.            FINDINGS:        VENTRICLES/SULCI: Diffuse sulcal and ventricular prominence concordant    with age.  No hydrocephalus.   INTRACRANIAL:  Negative for intracranial hemorrhage, mass effect, or acute    large vessel transcortical infarct.  Symmetric areas of hypoattenuation of    bilateral basal ganglia could represent prominent perivascular spaces    versus remote infarct.  Gray-white     differentiation is preserved.  Intracranial atherosclerosis.     SINUSES:           Mucoperiosteal thickening of bilateral maxillary    sinuses.  Trace right mastoid effusion.   SKULL:               No evidence for fracture or osseous abnormality.   OTHER:               No scalp hematoma.                         =====   CONCLUSION:         No acute intracranial process identified.               LOCATION:  [facility]           Dictated by (CST): Alec Oilva MD on 1/04/2025 at 6:55 PM        Finalized by (CST): Alec Oliva MD on 1/04/2025 at 6:58 PM               ASSESSMENT & PLAN   Bg Watkins is a 64 year old male with PMH sig for ESRD on HD HTN, HLD, CVA, non-verbal and bed bound at baseline.  Presents with increased lethargy, vomiting and decreased PO intake.  CT head negative for acute intracranial process. Found to be positive for RSV. Nephrology is consulted for dialysis management.  Patient typically receives HD MWF at [facility]; primary nephrologist is Dr. Ridley. However patient has currently been at a facility for the last month and has been receiving dialysis 4 days per week.  Lat dialysis session was Friday 1/3.     ESRD on HD   - typically receives dialysis MWF, however has been receiving dialysis 4x weekly (MTThF) at his current facility.  Last dialysis on Friday 1/3  - no acute indication for RRT today   - Plan for dialysis today. UF 0-1L. HD directly managed   - avoid morphine and deirdre. Renally dose meds     Hyponatremia   - mild. Will improve w HD     Anemia   - hgb 9.2, iron stores low w %sat 13 but ferritin 1681. Avoid IV iron w infection     Fever  - defer to primary team, recommend blood cultures     Thank you for allowing me to participate in the care of this patient. Please do not hesitate to call with questions or concerns.       Tori Urias MD  [facility] Nephrology

## 2025-01-06 NOTE — CONSULTS
NEPHROLOGY CONSULT NOTE     DATE: 1/5/2025    Requesting Physician: Dr. Valdez     Reason for Consult: HD management      HISTORY OF PRESENT ILLNESS: Bg Watkins is a 64 year old male with PMH sig for ESRD on HD HTN, HLD, CVA, non-verbal and bed bound at baseline.  Presents with increased lethargy, vomiting and decreased PO intake.  CT head negative for acute intracranial process. Found to be positive for RSV. Nephrology is consulted for dialysis management.  Patient typically receives HD MWF at [facility]; primary nephrologist is Dr. Ridley. However patient has currently been at a facility for the last month and has been receiving dialysis 4 days per week.  Lat dialysis session was Friday 1/3.    History obtained from Sister/ POA at bedside      MEDICAL HISTORY:   Past Medical History:    Acute pulmonary edema (HCC)    DIABETES    HTN (hypertension), benign    HYPERLIPIDEMIA    Stroke (HCC)    basil ganglia stroke    Tobacco use disorder       SURGICAL HISTORY:   Past Surgical History:   Procedure Laterality Date    Cataract Right 04/12/2018    Tej Trent IOL    Cataract Left 05/10/2018    Tej Trent IOL    Other  1990    left foot surgery       FAMILY HISTORY:   Family History   Problem Relation Age of Onset    Diabetes Father     Hypertension Father     Diabetes Mother        SOCIAL HISTORY:   Social History     Socioeconomic History    Marital status: Single     Spouse name: Not on file    Number of children: 0    Years of education: Not on file    Highest education level: Not on file   Occupational History    Occupation:    Tobacco Use    Smoking status: Former     Current packs/day: 0.00     Types: Cigarettes     Quit date: 3/1/2021     Years since quitting: 3.8    Smokeless tobacco: Never    Tobacco comments:     14 cigarettes a day   Substance and Sexual Activity    Alcohol use: Yes     Alcohol/week: 0.0 standard drinks of alcohol    Drug use: No    Sexual activity: Not Currently    Other Topics Concern    Not on file   Social History Narrative    . Single.     Social Drivers of Health     Financial Resource Strain: Not on file   Food Insecurity: Unknown (1/4/2025)    Food Insecurity     Food Insecurity: Patient unable to answer   Transportation Needs: Unknown (1/4/2025)    Transportation Needs     Lack of Transportation: Patient unable to answer     Car Seat: Not on file   Physical Activity: Not on file   Stress: Not on file   Social Connections: Not on file   Housing Stability: Unknown (1/4/2025)    Housing Stability     Housing Instability: Patient unable to answer     Housing Instability Emergency: Not on file     Crib or [place]in[place]: Not on file       MEDICATIONS:   Current Facility-Administered Medications   Medication Dose Route Frequency    insulin aspart (NovoLOG) 100 Units/mL FlexPen 1-5 Units  1-5 Units Subcutaneous TID AC and HS    aspirin DR tab 81 mg  81 mg Oral Daily    atorvastatin (Lipitor) tab 80 mg  80 mg Oral Nightly    [START ON 1/6/2025] calcitriol (Rocaltrol) cap 0.25 mcg  0.25 mcg Oral Once per day on Monday Wednesday Friday    clopidogrel (Plavix) tab 75 mg  75 mg Oral Daily    acetaminophen (Tylenol Extra Strength) tab 500 mg  500 mg Oral Q4H PRN    melatonin tab 3 mg  3 mg Oral Nightly PRN    heparin (Porcine) 5000 UNIT/ML injection 5,000 Units  5,000 Units Subcutaneous 2 times per day         ALLERGIES: Allergies[1]    REVIEW OF SYSTEMS:  Unable to obtain as patient is non-verbal      PHYSICAL EXAM:  /44 (BP Location: Left arm)   Pulse 55   Temp 98.6 °F (37 °C) (Axillary)   Resp 16   Wt 169 lb 12.1 oz (77 kg)   SpO2 94%   BMI 25.81 kg/m²   GEN:  NAD  HEENT: NCAT    CHEST: coarse breath sounds    CARDIAC: S1S2 normal  ABD: soft, NT/ND  EXT:  no lower ext edema  NEURO: awake, non-verbal   AVF with palpable thrill and bruit      LABS:  Recent Labs   Lab 01/04/25  1731 01/05/25  0651   * 127*   BUN 31* 37*   CREATSERUM 5.36* 6.17*   EGFRCR  11* 9*   CA 9.7 9.8   * 133*   K 4.6 4.4   CL 92* 94*   CO2 31.0 30.0     Recent Labs   Lab 01/04/25  1730 01/05/25  0651   RBC 2.58* 2.70*   HGB 8.3* 8.6*   HCT 23.7* 25.1*   MCV 91.9 93.0   MCH 32.2 31.9   MCHC 35.0 34.3   RDW 15.3 15.6   NEPRELIM 5.35  --    WBC 7.0 13.4*   .0 233.0         IMAGING:  XR CHEST AP PORTABLE  (CPT=71045)    Result Date: 1/4/2025  CONCLUSION:  Normal cardiac and mediastinal contours.  No pulmonary edema or focal airspace consolidation.  The pleural spaces are clear.   LOCATION:  [facility]      Dictated by (CST): Alec Oliva MD on 1/04/2025 at 7:08 PM     Finalized by (CST): Alec Oliva MD on 1/04/2025 at 7:09 PM       CT BRAIN OR HEAD (CPT=70450)    Result Date: 1/4/2025  CONCLUSION:   No acute intracranial process identified.    LOCATION:  [facility]   Dictated by (CST): Alec Oliva MD on 1/04/2025 at 6:55 PM     Finalized by (CST): Alec Oliva MD on 1/04/2025 at 6:58 PM           ASSESSMENT AND PLAN:   This is a 64 year old male with PMH sig for ESRD on HD HTN, HLD, CVA, non-verbal and bed bound at baseline.  Presents with increased lethargy, vomiting and decreased PO intake.  Found to be RSV positive. Nephrology is consulted for dialysis.     ESRD on HD   - typically receives dialysis MWF, however has been receiving dialysis 4x weekly (MTThF) at his current facility.  Last dialysis on Friday 1/3  - no acute indication for RRT today   - Plan for dialysis tomorrow  - check phos level      Hyponatremia   - Na mildly low - should improve with dialysis and fluid removal    - follow Na level     Anemia   - Hb 8.6   - check iron studies      Thank you for the consult and allowing us to participate in the care of ParishSTORM Watkins.  We will continue to follow.    Tete Early MD  [facility]  - Nephrology  1/5/2025         [1] No Known Allergies

## 2025-01-06 NOTE — PHYSICAL THERAPY NOTE
Reviewed pt's chart and discuss pt with RN. Pt was admitted from [facility] for Encephalopathy. Met with pt's family earlier in the morning, pt's family reports that pt was able to go to bed to W/C with family's assistance. Pt requires assistance for his ADLs. Pt does not walk but is able to take a few steps. Pt has a hx of CVA of the Basal Ganglia. Pt has communication deficit.   Pt was undergoing Dialysis at this time. PT will re-attempt when appropriate.

## 2025-01-06 NOTE — CM/SW NOTE
SW noted that patient admitted from [facility]. Consult received for POLST completion. SW messaged [facility] to verify if they have a copy of POLST on file.     Update: SW received update from [facility] that patient was admitted to them under his rehab benefits and is not a LTC care resident there. He was a FULL CODE while in rehab. SW reviewed chart and code status was updated on admission to DNAR/DNI in the ED. SW discussed with hospitalist via secure chat. SW received consult for POLST due to family having questions regarding POLST and GOC questions. Palliative consult placed per MD discussion for GOC discussion.     SW will continue to follow for plan of care changes and remain available for any additional DC needs or concerns.     Mariah Winslow MSW, LSW  Discharge Planner   [id number]

## 2025-01-06 NOTE — PROGRESS NOTES
[facility] Hospitalist Progress Note                                                                     [facility]   part of [facility]      Bg Watkins  10/27/1960    SUBJECTIVE: PT not responding today     OBJECTIVE:  Temp:  [98.1 °F (36.7 °C)-102.7 °F (39.3 °C)] 99 °F (37.2 °C)  Pulse:  [55-98] 74  Resp:  [16-18] 18  BP: ()/(44-67) 97/47  SpO2:  [91 %-98 %] 95 %  Exam  Gen: No acute distress, lethargic  Pulm: Lungs clear bilaterally, normal respiratory effort  CV: Heart with regular rate and rhythm, no murmur.    Abd: Abdomen soft, nontender, nondistended, bowel sounds present  MSK: no significant pitting edema or tenderness of the LE  Skin: no new rashes or lesions    Labs:   Recent Labs   Lab 01/04/25 1730 01/05/25  0651 01/06/25  0638   WBC 7.0 13.4* 11.0   HGB 8.3* 8.6* 9.2*   MCV 91.9 93.0 92.5   .0 233.0 214.0       Recent Labs   Lab 01/04/25  1731 01/05/25  0651 01/06/25  0638   * 133* 137   K 4.6 4.4 5.0   CL 92* 94* 97*   CO2 31.0 30.0 27.0   BUN 31* 37* 59*   CREATSERUM 5.36* 6.17* 8.10*   CA 9.7 9.8 9.7   MG 2.2  --   --    PHOS 3.2  --  2.8   * 127* 150*       Recent Labs   Lab 01/04/25  1731 01/05/25  0651 01/06/25  0638   ALT <7* <7* <7*   AST 17 13 14   ALB 3.7 3.7 3.8       Recent Labs   Lab 01/05/25  0623 01/05/25  1203 01/05/25  1705 01/05/25  2114 01/06/25  0544   PGLU 147* 145* 139* 163* 164*       Meds:   Scheduled:    insulin aspart  1-5 Units Subcutaneous TID AC and HS    aspirin  81 mg Oral Daily    atorvastatin  80 mg Oral Nightly    calcitriol  0.25 mcg Oral Once per day on Monday Wednesday Friday    clopidogrel  75 mg Oral Daily    heparin  5,000 Units Subcutaneous 2 times per day     Continuous Infusions:   PRN:   sodium chloride **AND** albumin human    acetaminophen    melatonin    Assessment/ Plan:      #RSV  #Acute hypoxia with mucus plugging  #Leukocytosis   #Acute metabolic encephalopathy  -CXR  without infiltrates.  CT head negative.  -Supportive therapy,  nebs, bronchial hygiene.  -Will maintain patient NPO for now due to mentation.  -will consider neuro evaluation if no improvement in 1-2 days     #Hypertension  #Hyperlipidemia  #ESRD on HD  #Type 2 diabetes  -Holding home PO until mentation improves.  Discussed with bedside RN.  -Sliding scale coverage as patient is with minimal oral intake.  -Nephrology on consult for HD management    #Fever  -likely due to ciral infection  -check blood cx     #Advanced care planning  -Discussed with patients POA/ Maria Del Rosario. Updated code status to DNAR / DNI.     DVT ppx: Heparin subcu  Diet: NPO   Code status: DNAR/ DNI  Disposition: No discharge    Phillip Sherwood MD  [facility] Hospitalist  [phone]

## 2025-01-06 NOTE — PROGRESS NOTES
Patient opens eyes to stimuli. Room air. Aspiration precautions. Per family patient is nonverbal at baseline. Patient suctioned due to thick secretions . R arm precautions. Droplet precautions in place. Nephrology consulted and aware. Safety precautions in place. Call light within reach.

## 2025-01-06 NOTE — CONSULTS
[facility]   part of [facility]  Palliative Care Initial Consult    Bg Watkins Patient Status:  Inpatient    10/27/1960 MRN [id number]   Location [facility] 4NW-A Attending Duran Redding MD   Hosp Day # 2 PCP April Simmons MD   [id number]     Date of Consult: 25   Today is day #2 of hospital stay.     Palliative care consultation was requested by Dr. Sherwood  for evaluation of palliative care needs and support with Goals of care discussion.    History of Present Illness:        Bg Watkins is a 64 year old male with PMH significant for ESRD on HD, HTN, CVA - bedbound and nonverbal in addition to the other conditions noted below, presented to ED on 2025 with CC of AMS, reduced PO intake, and found to be RSV +. Ct neg for acute process. Patient is undergoing evaluation and treatment for RSV infection, acute hypoxia w mucous plugging, leukocytosis, encephalopathy, ESRD. Code status was explored w sister / POA on  and limits were set for DNAR, DNI. He is receiving HD at time of visit, but per HD tech, hypotensive so limited fluid removal and had received albumin x1.     Therapy recommendations are pending ability to participate. He has been at [facility] for BRANDY per family (?LTC).     This is the 1st hospitalization in the past 6 months.    Medical History: obtained from [facility]  Past Medical History:    Acute pulmonary edema (HCC)    DIABETES    HTN (hypertension), benign    HYPERLIPIDEMIA    Stroke (HCC)    basil ganglia stroke    Tobacco use disorder     Past Surgical History:   Procedure Laterality Date    Cataract Right 2018    Tej Trent IOL    Cataract Left 05/10/2018    Tej Trent IOL    Other  1990    left foot surgery       Allergies:  Allergies[1]    Medications:     Current Facility-Administered Medications:     insulin aspart (NovoLOG) 100 Units/mL FlexPen 1-5 Units, 1-5 Units, Subcutaneous, TID AC and HS    aspirin DR tab 81 mg, 81 mg, Oral, Daily     atorvastatin (Lipitor) tab 80 mg, 80 mg, Oral, Nightly    calcitriol (Rocaltrol) cap 0.25 mcg, 0.25 mcg, Oral, Once per day on Monday Wednesday Friday    clopidogrel (Plavix) tab 75 mg, 75 mg, Oral, Daily    sodium chloride 0.9 % IV bolus 100 mL, 100 mL, Intravenous, Q30 Min PRN **AND** albumin human (Albumin) 25% injection 25 g, 25 g, Intravenous, PRN Dialysis    acetaminophen (Tylenol Extra Strength) tab 500 mg, 500 mg, Oral, Q4H PRN    melatonin tab 3 mg, 3 mg, Oral, Nightly PRN    heparin (Porcine) 5000 UNIT/ML injection 5,000 Units, 5,000 Units, Subcutaneous, 2 times per day  No current outpatient medications on file.       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 He has never used smokeless tobacco.  reports current alcohol use.  reports no history of drug use.      Spiritual Assessment:   Quaker - Parish Not Listed; spiritual  care following    HPI obtained from Epic and pt's family.    SUBJECTIVE  Review of Systems - Palliative Care Symptom Assessment     I visited Bg and he was not able to respond to me in response to my voice or touch on his sternum. He was receiving HD. His family was in waiting area.     OBJECTIVE       Medications:    Current Facility-Administered Medications:     insulin aspart (NovoLOG) 100 Units/mL FlexPen 1-5 Units, 1-5 Units, Subcutaneous, TID AC and HS    aspirin DR tab 81 mg, 81 mg, Oral, Daily    atorvastatin (Lipitor) tab 80 mg, 80 mg, Oral, Nightly    calcitriol (Rocaltrol) cap 0.25 mcg, 0.25 mcg, Oral, Once per day on Monday Wednesday Friday    clopidogrel (Plavix) tab 75 mg, 75 mg, Oral, Daily    sodium chloride 0.9 % IV bolus 100 mL, 100 mL, Intravenous, Q30 Min PRN **AND** albumin human (Albumin) 25% injection 25 g, 25 g, Intravenous, PRN Dialysis    acetaminophen (Tylenol Extra Strength) tab 500 mg, 500 mg, Oral, Q4H PRN    melatonin tab 3 mg, 3 mg, Oral, Nightly PRN    heparin (Porcine) 5000 UNIT/ML injection 5,000 Units, 5,000 Units, Subcutaneous, 2 times per day    Hematology:   Lab Results   Component Value Date    WBC 11.0 01/06/2025    HGB 9.2 (L) 01/06/2025    HCT 27.2 (L) 01/06/2025    .0 01/06/2025       Chemistry:  Lab Results   Component Value Date    CREATSERUM 8.10 (H) 01/06/2025    BUN 59 (H) 01/06/2025     01/06/2025    K 5.0 01/06/2025    CL 97 (L) 01/06/2025    CO2 27.0 01/06/2025     (H) 01/06/2025    CA 9.7 01/06/2025    ALB 3.8 01/06/2025    ALKPHO 147 (H) 01/06/2025    BILT 0.4 01/06/2025    TP 7.2 01/06/2025    AST 14 01/06/2025    ALT <7 (L) 01/06/2025    PSA 1.01 05/17/2021    MG 2.2 01/04/2025    PHOS 2.8 01/06/2025       Imaging:  XR CHEST AP PORTABLE  (CPT=71045)    Result Date: 1/4/2025  CONCLUSION:  Normal cardiac and mediastinal contours.  No pulmonary edema or focal airspace consolidation.  The pleural spaces are clear.   LOCATION:  [facility]       Dictated by (CST): Alec Oliva MD on 1/04/2025 at 7:08 PM     Finalized by (CST): Alec Oliva MD on 1/04/2025 at 7:09 PM       CT BRAIN OR HEAD (CPT=70450)    Result Date: 1/4/2025  CONCLUSION:   No acute intracranial process identified.    LOCATION:  [facility]   Dictated by (CST): Alec Oliva MD on 1/04/2025 at 6:55 PM     Finalized by (CST): Alec lOiva MD on 1/04/2025 at 6:58 PM        Vital Signs:  Blood pressure 97/47, pulse 74, temperature 99 °F (37.2 °C), temperature source Axillary, resp. rate 18, weight 169 lb 12.1 oz (77 kg), SpO2 95%.      Supplemental O2:       Physical Exam:       GENERAL: Lethargic. Appears debilitated. NAD.  BODY HABITUS:  Body mass index is 25.81 kg/m².  HEENT: dry oral mucosa  CARDIAC: HR 70s. SBPs 90s per recent tele reading  RESPIRATORY: no increased effort at rest on nc.  EXTREMITIES: contracted  NEUROLOGIC: lethargic, mercedes.  SKIN: Warm and dry.     Pre-hospital Palliative Performance Scale: (pt/family reported/per chart review): 50%  Current Palliative Performance Scale:  20%    Palliative Performance Scale   % Ambulation Activity Level Self-Care Intake Consciousness   100 Full Normal Full   Normal Full   90 Full No disease  Normal Full Normal Full   80 Full Some disease  Normal w/effort Full Normal or  Reduced Full   70 Reduced Some disease  Can't perform job Full Normal or   Reduced Full   60 Reduced Significant disease  Can't perform hobby Occasional  Assist Normal or   Reduced Full or confused   50 Mainly sit/lie Extensive Disease  Can't do any work Partial Assist Normal or Reduced Full or confused   40 Mainly in bed Extensive Disease  Can't do any work Mainly Assist Normal or Reduced Full or confused   30 Bedbound Extensive Disease  Can't do any work Max Assist  Total Care Reduced Drowsy/confused   20 Bedbound Extensive Disease  Can't do any work Max Assist  Total Care Minimal Drowsy/confused   10 Bedbound/coma Extensive Disease  Can't do any work  coma  Max Assist  Total Care  Mouth care Drowsy or coma   0 Death     Palliative Care Assessment       Goals of Care:      Provided introduction to Palliative Care and reason for consultation to Bg's sister (ELLEN) Maria Del Rosario and other siblings in family waiting room. Discussion included the benefits of palliative care to provide extra layer of support to patient/family who wish to continue curative or restorative medical therapies. Palliative care was differentiated from hospice philosophy and model of care by reviewing the treatment-focus with palliative care, versus comfort-focused care with hospice. I also informed that having palliative care support does not limit medical treatment options or decisions.       Palliative Care Services:  1) Usually visit once per 2-3 weeks  2) Perform GOC discussions  3) Follow up on symptom management    Palliative Care criteria:  1) Is not effected by prognostication (can receive palliative care services if prognosis is in hours, days, months, years, decades)  2) May continue life-prolonging measures and treatments  3) Includes treatment of symptoms to improve quality of life while receiving above measures and treatments  4) Consultation services Hospice services:  1) Phone services 24/7 (they will be your 911 at all hours when symptoms flare)  2) Increase visits according to symptoms (more robust than palliative care)    Hospice criteria:  1) Requires less than 6 months prognosis of life by two physicians  2) Must forego life-prolonging measures and treatments  3) Focus on complete and total comfort care  4) Must agree to sign on hospice benefit to receive services       Diagnostic understanding and Prognostic Awareness:  Bg's family is working to gain an understanding of the connection between his chronic/life-limiting and acute conditions. They are assessing his response to treatment and considering the overall direction of his care. They know he would not want CPR / intubation, as they have recently  discussed this w him.     He was previously mentating and interacting w them in his own limited way of communicating since stroke. In recent weeks, he has declined in how much he is eating, interacting, and requiring more help with his feeding. They are concerned w care at recent SNF as they have seen condition deteriorate.       Hopes / Goals:  For response to treatment and for him to return to baseline where he is interactive w them and able to feed himself, make his needs known.    Concerns / Fears:  Care environment at prior SNF, exploring possibility of alternative SNF at DC vs home. We discussed his current care requirements including dialysis, and explored whether family would be able to accommodate and transport to HD. Will explore w SW if other options for placement are possible.  Will his mentation improve?  Will he be able to take food by mouth, and eat enough? If not, would he want to be fed by a tube?  What if no improvement? They would want to take him home to be around by family and comfortable in his final time.     Advance Care Planning:    Code Status:  DNAR/Selective Treatment.  A voluntary discussion surrounding resuscitation and LST took place with Bg's sister Candie and other siblings. All confirm DNAR, DNI. POLST is deferred as GOC are being defined. Family aware document will be indicated at [place].    HCPOA:  no document on file.  Healthcare Agent Appointed: Yes  Healthcare Agent's Name: candie ziegler  Healthcare Agent's Phone Number: [phone]        Problem List:       Principal Problem:    Encephalopathy due to other virus  Active Problems:    Encephalopathy    Weakness generalized    FTT (failure to thrive) in adult    Palliative care encounter    Counseling regarding advance care planning and goals of care      Palliative Care Recommendations / Plan:       Goals of Care: Discussions are ongoing  Continue medical management while Bg's family assesses his response to treatment and considers  direction of his care.  If treatment focus continues, family wants to explore [facility]  If comfort is pursued, family would want to bring him home, if possible vs back to [facility].  Family aware nutrition may need to be addressed in the coming days.    Code Status: DNAR/Selective Treatment.  Confirmed w sister Maria Del Rosario and siblings. POLST - deferred as GOC are being defined    HCPOA: SisterMaria Del Rosario     Psychosocial:  Emotional support provided to Bg's family.    Disposition:  pending clinical course.      A total of 55 minutes were spent on this consult, which included all of the following:  Chart review, direct face to face contact, history taking, physical examination, counseling and coordinating care, and documentation.     I reviewed the above with patient's RN and SW.    Thank you for inviting Palliative Care Service to participate in the care of Bg Watkins and family.       Will follow.      BASHIR Thakkar  Palliative Care    1/6/2025  5:03 PM      The 21st Century Cures Act makes medical notes like these available to patients in the interest of transparency. Please be advised this is a medical document. Medical documents are intended to carry relevant information, facts as evident, and the clinical opinion of the practitioner. The medical note is intended as a peer to peer communication, and may appear blunt or direct. It is written in medical language and may contain abbreviations or verbiage that are unfamiliar.           [1] No Known Allergies

## 2025-01-07 LAB
ALBUMIN SERPL-MCNC: 4 G/DL (ref 3.2–4.8)
ALBUMIN/GLOB SERPL: 1.3 {RATIO} (ref 1–2)
ALP LIVER SERPL-CCNC: 123 U/L
ALT SERPL-CCNC: <7 U/L
AMMONIA PLAS-MCNC: 12 UMOL/L (ref 11–32)
ANION GAP SERPL CALC-SCNC: 12 MMOL/L (ref 0–18)
AST SERPL-CCNC: 16 U/L (ref ?–34)
BILIRUB SERPL-MCNC: 0.5 MG/DL (ref 0.2–1.1)
BUN BLD-MCNC: 44 MG/DL (ref 9–23)
CALCIUM BLD-MCNC: 9.8 MG/DL (ref 8.7–10.4)
CHLORIDE SERPL-SCNC: 101 MMOL/L (ref 98–112)
CO2 SERPL-SCNC: 27 MMOL/L (ref 21–32)
CREAT BLD-MCNC: 5.85 MG/DL
EGFRCR SERPLBLD CKD-EPI 2021: 10 ML/MIN/1.73M2 (ref 60–?)
ERYTHROCYTE [DISTWIDTH] IN BLOOD BY AUTOMATED COUNT: 16.1 %
GLOBULIN PLAS-MCNC: 3 G/DL (ref 2–3.5)
GLUCOSE BLD-MCNC: 162 MG/DL (ref 70–99)
GLUCOSE BLD-MCNC: 165 MG/DL (ref 70–99)
GLUCOSE BLD-MCNC: 168 MG/DL (ref 70–99)
GLUCOSE BLD-MCNC: 176 MG/DL (ref 70–99)
GLUCOSE BLD-MCNC: 184 MG/DL (ref 70–99)
HCT VFR BLD AUTO: 26.1 %
HGB BLD-MCNC: 8.6 G/DL
MAGNESIUM SERPL-MCNC: 2.3 MG/DL (ref 1.6–2.6)
MCH RBC QN AUTO: 31.4 PG (ref 26–34)
MCHC RBC AUTO-ENTMCNC: 33 G/DL (ref 31–37)
MCV RBC AUTO: 95.3 FL
OSMOLALITY SERPL CALC.SUM OF ELEC: 305 MOSM/KG (ref 275–295)
PHOSPHATE SERPL-MCNC: 3.6 MG/DL (ref 2.4–5.1)
PLATELET # BLD AUTO: 174 10(3)UL (ref 150–450)
POTASSIUM SERPL-SCNC: 4.2 MMOL/L (ref 3.5–5.1)
PROT SERPL-MCNC: 7 G/DL (ref 5.7–8.2)
RBC # BLD AUTO: 2.74 X10(6)UL
SODIUM SERPL-SCNC: 140 MMOL/L (ref 136–145)
TSI SER-ACNC: 1.91 UIU/ML (ref 0.55–4.78)
VIT B12 SERPL-MCNC: 740 PG/ML (ref 211–911)
WBC # BLD AUTO: 10.8 X10(3) UL (ref 4–11)

## 2025-01-07 PROCEDURE — 99232 SBSQ HOSP IP/OBS MODERATE 35: CPT | Performed by: NURSE PRACTITIONER

## 2025-01-07 RX ORDER — HYDRALAZINE HYDROCHLORIDE 20 MG/ML
10 INJECTION INTRAMUSCULAR; INTRAVENOUS EVERY 6 HOURS PRN
Status: DISCONTINUED | OUTPATIENT
Start: 2025-01-07 | End: 2025-01-10

## 2025-01-07 RX ORDER — VANCOMYCIN 2 GRAM/500 ML IN 0.9 % SODIUM CHLORIDE INTRAVENOUS
25 ONCE
Status: COMPLETED | OUTPATIENT
Start: 2025-01-07 | End: 2025-01-07

## 2025-01-07 RX ORDER — ACETAMINOPHEN 650 MG/1
650 SUPPOSITORY RECTAL EVERY 6 HOURS PRN
Status: DISCONTINUED | OUTPATIENT
Start: 2025-01-07 | End: 2025-01-10

## 2025-01-07 NOTE — PROGRESS NOTES
[facility]   part of [facility]    Nephrology Progress Note    Bg Watkins Attending:  Duran Redding MD     Cc: esrd    SUBJECTIVE      Sleeping, no complaints noted     PHYSICAL EXAM   Vital signs: BP (!) 187/74 (BP Location: Left arm)   Pulse 100   Temp (!) 101.8 °F (38.8 °C) (Axillary)   Resp 16   Wt 169 lb 12.1 oz (77 kg)   SpO2 97%   BMI 25.81 kg/m²   Temp (24hrs), Av.2 °F (37.9 °C), Min:98.7 °F (37.1 °C), Max:101.8 °F (38.8 °C)     No intake or output data in the 24 hours ending 25 1716  Wt Readings from Last 3 Encounters:   25 169 lb 12.1 oz (77 kg)   22 169 lb 12.8 oz (77 kg)   21 167 lb 12.8 oz (76.1 kg)     General: NAD  HEENT: NCAT, MMM  Neck: Supple   Cardiac: Regular rate and rhythm   Lungs: Coarse  Abdomen: Soft, non-tender, nondistended   Extremities: No edema  Neurologic: No asterxis  Skin: Warm and dry, no rashes     MEDS     Current Facility-Administered Medications   Medication Dose Route Frequency    acetaminophen (Tylenol) rectal suppository 650 mg  650 mg Rectal Q6H PRN    hydrALAzine (Apresoline) 20 mg/mL injection 10 mg  10 mg Intravenous Q6H PRN    vancomycin (Vancocin) 2 g in sodium chloride 0.9% 500mL IVPB premix  25 mg/kg Intravenous Once    insulin aspart (NovoLOG) 100 Units/mL FlexPen 1-5 Units  1-5 Units Subcutaneous TID AC and HS    aspirin DR tab 81 mg  81 mg Oral Daily    atorvastatin (Lipitor) tab 80 mg  80 mg Oral Nightly    calcitriol (Rocaltrol) cap 0.25 mcg  0.25 mcg Oral Once per day on     clopidogrel (Plavix) tab 75 mg  75 mg Oral Daily    sodium chloride 0.9 % IV bolus 100 mL  100 mL Intravenous Q30 Min PRN    And    albumin human (Albumin) 25% injection 25 g  25 g Intravenous PRN Dialysis    acetaminophen (Tylenol Extra Strength) tab 500 mg  500 mg Oral Q4H PRN    melatonin tab 3 mg  3 mg Oral Nightly PRN    heparin (Porcine) 5000 UNIT/ML injection 5,000 Units  5,000 Units Subcutaneous 2 times per  day       LABS     Lab Results   Component Value Date    WBC 10.8 01/07/2025    HGB 8.6 01/07/2025    HCT 26.1 01/07/2025    .0 01/07/2025    CREATSERUM 5.85 01/07/2025    BUN 44 01/07/2025     01/07/2025    K 4.2 01/07/2025     01/07/2025    CO2 27.0 01/07/2025     01/07/2025    CA 9.8 01/07/2025    ALB 4.0 01/07/2025    ALKPHO 123 01/07/2025    BILT 0.5 01/07/2025    TP 7.0 01/07/2025    AST 16 01/07/2025    ALT <7 01/07/2025    TSH 1.908 01/07/2025    MG 2.3 01/07/2025    PHOS 3.6 01/07/2025    B12 740 01/07/2025    PGLU 162 01/07/2025       IMAGING   All imaging studies personally reviewed.    XR CHEST AP PORTABLE  (CPT=71045)   Final Result   PROCEDURE:  XR CHEST AP PORTABLE  (CPT=71045)       TECHNIQUE:  AP chest radiograph was obtained.       COMPARISON:  None.       INDICATIONS:  n/v/f       PATIENT STATED HISTORY: (As transcribed by Technologist)  Patient arrived    via EMS [facility] for n/v/d/fever.            FINDINGS:                           =====   CONCLUSION:  Normal cardiac and mediastinal contours.  No pulmonary edema    or focal airspace consolidation.  The pleural spaces are clear.           LOCATION:  [facility]                       Dictated by (CST): Alec Oliva MD on 1/04/2025 at 7:08 PM        Finalized by (CST): Alec Oliva MD on 1/04/2025 at 7:09 PM         CT BRAIN OR HEAD (CPT=70450)   Final Result   PROCEDURE:  CT BRAIN OR HEAD (65170)       COMPARISON:  None.       INDICATIONS:  AMS       TECHNIQUE:  Noncontrast CT scanning is performed through the brain. Dose    reduction techniques were used. Dose information is transmitted to the ACR    (American College of Radiology) NRDR (National Radiology Data Registry)    which includes the Dose Index    Registry.       PATIENT STATED HISTORY: (As transcribed by Technologist)  Pt with altered    mental status nausea and vomiting.            FINDINGS:        VENTRICLES/SULCI: Diffuse sulcal and ventricular prominence  concordant    with age.  No hydrocephalus.   INTRACRANIAL:  Negative for intracranial hemorrhage, mass effect, or acute    large vessel transcortical infarct.  Symmetric areas of hypoattenuation of    bilateral basal ganglia could represent prominent perivascular spaces    versus remote infarct.  Gray-white    differentiation is preserved.  Intracranial atherosclerosis.     SINUSES:           Mucoperiosteal thickening of bilateral maxillary    sinuses.  Trace right mastoid effusion.   SKULL:               No evidence for fracture or osseous abnormality.   OTHER:               No scalp hematoma.                         =====   CONCLUSION:         No acute intracranial process identified.               LOCATION:  [facility]           Dictated by (CST): Alec Oliva MD on 1/04/2025 at 6:55 PM        Finalized by (CST): Alec Oliva MD on 1/04/2025 at 6:58 PM               ASSESSMENT & PLAN   Bg Watkins is a 64 year old male with PMH sig for ESRD on HD HTN, HLD, CVA, non-verbal and bed bound at baseline.  Presents with increased lethargy, vomiting and decreased PO intake.  CT head negative for acute intracranial process. Found to be positive for RSV. Nephrology is consulted for dialysis management.  Patient typically receives HD MWF at [facility]; primary nephrologist is Dr. Ridley. However patient has currently been at a facility for the last month and has been receiving dialysis 4 days per week.  Lat dialysis session was Friday 1/3.     ESRD on HD   - typically receives dialysis MWF, however has been receiving dialysis 4x weekly (MTThF) at his current facility.  Last dialysis on Friday 1/3 PTA  - no acute indication for RRT today   - sp HD yesterday. Plan for dialysis tomorrow. UF 0-1L. HD directly managed   - avoid morphine and deirdre. Renally dose meds     Hyponatremia   - mild. Will improve w HD     Anemia   - hgb 9.2, iron stores low w %sat 13 but ferritin 1681. Avoid IV iron w infection     Fever  - 1 blood culture  growing GPC clusters, consider ID consult, defer to primary team     Thank you for allowing me to participate in the care of this patient. Please do not hesitate to call with questions or concerns.       Tori Urias MD  [facility]

## 2025-01-07 NOTE — CM/SW NOTE
SW received a consult order for hospice services. SW called patient's POA Maria Del Rosario to discuss and she reported that the family does not know any hospice agencies in the area and they do not have a preference. She is open to discussing services with [facility].     SW called and discussed with [facility] and requested that informational meeting be arranged for tomorrow. Referral sent in Aidin.     SW will continue to follow for plan of care changes and remain available for any additional DC needs or concerns.     Mariah Winslow MSW, LSW  Discharge Planner   [id number]

## 2025-01-07 NOTE — PROGRESS NOTES
[facility]   part of [facility]  Palliative Care Follow Up    Bg Watkins Patient Status:  Inpatient    10/27/1960 MRN [id number]   Location [facility] 4NW-A Attending Duran Redding MD   Hosp Day # 3 PCP April Simmons MD   [id number]    Date of visit:  2025  Day 3 of hospitalization.        Summary:         Bg Watkins is a 64 year old male with PMH significant for ESRD on HD, HTN, CVA - bedbound and nonverbal in addition to the other conditions noted below, presented to ED on 2025 with CC of AMS, reduced PO intake, and found to be RSV +. Ct neg for acute process. Patient is undergoing evaluation and treatment for RSV infection, acute hypoxia w mucous plugging, leukocytosis, encephalopathy, ESRD. Code status was explored w sister / POA on  and limits were set for DNAR, DNI. He is receiving HD at time of visit, but per HD tech, hypotensive so limited fluid removal and had received albumin x1.     Palliative care is following for support with goals of care.    Interval Events: Note neurology consult for AMS.  - Hypertensive today.  - Elevate temps (100.4) overnight w desaturations to high 70%s, on NC  - Case d/w RN who advises has not had PO BP meds, about to give IV hydralazine    SUBJECTIVE  Review of Systems - Palliative Care Symptom Assessment     I visited Bg without visitors at bedside. He remains mostly unresponsive.     OBJECTIVE     Hematology:   Lab Results   Component Value Date    WBC 10.8 2025    HGB 8.6 (L) 2025    HCT 26.1 (L) 2025    .0 2025       Chemistry:  Lab Results   Component Value Date    CREATSERUM 5.85 (H) 2025    BUN 44 (H) 2025     2025    K 4.2 2025     2025    CO2 27.0 2025     (H) 2025    CA 9.8 2025    ALB 4.0 2025    ALKPHO 123 (H) 2025    BILT 0.5 2025    TP 7.0 2025    AST 16 2025    ALT <7 (L) 2025    PSA  1.01 05/17/2021    MG 2.3 01/07/2025    PHOS 3.6 01/07/2025       Imaging:  No results found.    Vital Signs:  Blood pressure (!) 175/63, pulse 76, temperature 100.3 °F (37.9 °C), temperature source Oral, resp. rate 18, weight 169 lb 12.1 oz (77 kg), SpO2 98%.  Body mass index is 25.81 kg/m².    Physical Exam:     GENERAL: Lethargic/unresponsive. NAD.  HEENT: +++ audible upper airway congestion, worse today  CARDIAC: HR 70s per tele, hypertensive  RESPIRATORY: no increased effort at rest.  NEUROLOGIC: not responding to voice/stim.   SKIN: Warm and dry.     Palliative Performance Scale: 20%   Palliative Performance Scale   % Ambulation Activity Level Self-Care Intake Consciousness   100 Full Normal Full   Normal Full   90 Full No disease  Normal Full Normal Full   80 Full Some disease  Normal w/effort Full Normal or  Reduced Full   70 Reduced Some disease  Can't perform job Full Normal or   Reduced Full   60 Reduced Significant disease  Can't perform hobby Occasional  Assist Normal or   Reduced Full or confused   50 Mainly sit/lie Extensive Disease  Can't do any work Partial Assist Normal or Reduced Full or confused   40 Mainly in bed Extensive Disease  Can't do any work Mainly Assist Normal or Reduced Full or confused   30 Bedbound Extensive Disease  Can't do any work Max Assist  Total Care Reduced Drowsy/confused   20 Bedbound Extensive Disease  Can't do any work Max Assist  Total Care Minimal Drowsy/confused   10 Bedbound/coma Extensive Disease  Can't do any work  coma  Max Assist  Total Care Mouth care Drowsy or coma   0 Death       Palliative Care Assessment:     Goals of Care Discussion:     Met with Bg's family in family waiting room. Provided medical update. More family members are present today. Given they have known Bg for their lifetime, I asked them for their sense of what's happening with him. Family expressed their concern that he is possibly getting worse, and fear that he may not be able to  recover a QOL that he would appreciate.    We discussed his inability to take PO, upper airway congestion worsening today, requiring IV meds for HTN d/t inability to take PO meds, and consideration for access for nutrition/meds if treatment focus is GOC. Discussed neuro consult to explore if any further opportunities to treat reversible conditions. They are in agreement w ongoing workup.     They ultimately need more time to assess  Bg's response to treatment while considering his QOL and overall direction of his care.   They request to meet w hospice to gather information. Consult to  for hospice referral, and family is informed to notify SW of their preferred hospice agency.   If possible, family requests to hold off on placement of NGT/DHT while hospice exploration takes place.  If they decide to pursue enteral feeds, DHT/NGT would be ok, but PEG not in line w wishes.    They are open to PC follow up later in the week pending Bg's condition and pending their d/w hospice agency/agencies. Emotional support provided.        Problem List:       Principal Problem:    Encephalopathy due to other virus  Active Problems:    Encephalopathy    Weakness generalized    FTT (failure to thrive) in adult    Palliative care encounter    Counseling regarding advance care planning and goals of care  ESRD on HD    Palliative Care Recommendations/Plan:         Goals of Care:  Family is taking more time to assess  Bg's response to treatment while considering his QOL and overall direction of his care. They are aware of neuro eval pending.  They request to meet w hospice to gather information. Consult to  for hospice referral, and family is informed to notify SW of their preferred hospice agency.   Family requests to hold off on placement of NGT/DHT while hospice exploration takes place, if possible.   If they decide to pursue enteral feeds, DHT/NGT would be ok, but PEG not in line w wishes.    Code Status: DNAR/Selective  Treatment.   Discussed to confirm w sister Maria Del Rosario and other siblings on 1/6.   - Per d/w family on 1/7: PEG not in line w GOC.  POLST - deferred as GOC are being defined.     HCPOA: Healthcare Agent's Name: SisterMaria Del Rosario    Disposition:  Pending clinical course.      A total of 35 minutes were spent on this visit, which included all of the following:  Chart review, direct face to face contact, history taking, physical examination, counseling and coordinating care, and documentation.        Reviewed the above with patient's RN, LALY, primary.    Thank you for inviting Palliative Care to participate in the care of Bg Watkins and family.       Will remain available peripherally and support as needed.      BASHIR Thakkar  Palliative Care    1/7/2025  1:53 PM    The 21st Century Cures Act makes medical notes like these available to patients in the interest of transparency. Please be advised this is a medical document. Medical documents are intended to carry relevant information, facts as evident, and the clinical opinion of the practitioner. The medical note is intended as a peer to peer communication, and may appear blunt or direct. It is written in medical language and may contain abbreviations or verbiage that are unfamiliar.

## 2025-01-07 NOTE — OCCUPATIONAL THERAPY NOTE
OT following, pt continues to not follow commands and not opening eyes, OT will continue follow up as approp.

## 2025-01-07 NOTE — PHYSICAL THERAPY NOTE
Physical therapy following.  Pt case discussed with RN who reports pt is unresponsive, not responding to painful stimuli.  Will continue to hold therapy at this time, but will continue to follow and plan to initiate therapy service as clinically appropriate.

## 2025-01-07 NOTE — PLAN OF CARE
Pt a/o x0, nonverbal. Opens eyes. VSS. Weaned from 6L to 2L NC. Weak cough, congested. Suction prn. Meds per MAR. R AVF. BM x1 overnight.     Fall risk protocol followed, call light within reach.     This AM, weaned down to RA. Tmax 100.4. Ice packs applied. BP slightly elevated. MD updated.     Desat to 78-80s on RA, back on 2L NC w/ O2 sats >90%.

## 2025-01-07 NOTE — PLAN OF CARE
Pt remains unresponsive, HD completed with no fluid removal. Albumin given for low BP. Palliative care consulted for discussion of goals of care with family.

## 2025-01-07 NOTE — PLAN OF CARE
Pt received A&Ox0, nonverbal. Lethargic and somnolent. BP elevated-PRN administered. Febrile-tmax 101.8. PRN tylenol suppository given. Blood cx positive. MD notified. 1x dose of vanco given per orders. ID consulted. Dialysis ordered for tomorrow, [facility] notified. Family updated on POC. Plan to meet with hospice tomorrow. Frequent rounds and repositioning made. Fall and isolation precautions maintained.    Problem: Diabetes/Glucose Control  Goal: Glucose maintained within prescribed range  Description: INTERVENTIONS:  - Monitor Blood Glucose as ordered  - Assess for signs and symptoms of hyperglycemia and hypoglycemia  - Administer ordered medications to maintain glucose within target range  - Assess barriers to adequate nutritional intake and initiate nutrition consult as needed  - Instruct patient on self management of diabetes  Outcome: Progressing

## 2025-01-07 NOTE — PAYOR COMM NOTE
--------------  ADMISSION REVIEW     Payor: [employer]   Subscriber #:  [id number]  Authorization Number: [id number]    Admit date: 1/4/25  Admit time:  9:47 PM       ED Provider Notes        History   HPI  64 year old male with history of hypertension, hyperlipidemia, diabetes, basal ganglia CVA complicated by communication deficit, bedbound, ESRD on hemodialysis 4 days a week presents from facility for assessment.  For the past couple days patient has been minimally responsive, not eating or drinking, not opening his eyes and not saying anything.  Noted fever at the facility today.  No reports of cough or fevers or vomiting.  Patient unable to give any history.  History per family at bedside.  Family reports that at baseline he is able to speak a few words and can eat and drink, has not been able to the past couple days.     ED Triage Vitals [01/04/25 1719]   /62   Pulse 60   Resp 18   Temp 99.6 °F (37.6 °C)   Temp src Rectal   SpO2 100 %   O2 Device None (Room air)     Physical Exam  Constitutional:       Appearance: He is ill-appearing.      Comments: Will intermittently move about to pain stimuli   HENT:      Head: Normocephalic and atraumatic.      Mouth/Throat:      Mouth: Mucous membranes are dry.   Eyes:      Extraocular Movements: Extraocular movements intact.   Cardiovascular:      Rate and Rhythm: Normal rate and regular rhythm.      Pulses: Normal pulses.   Pulmonary:      Effort: Pulmonary effort is normal. No respiratory distress.      Comments: Coarse breath sounds bilaterally  Musculoskeletal:      Cervical back: Normal range of motion.     Labs Reviewed   COMP METABOLIC PANEL (14) - Abnormal; Notable for the following components:       Result Value    Glucose 151 (*)     Sodium 132 (*)     Chloride 92 (*)     BUN 31 (*)     Creatinine 5.36 (*)     eGFR-Cr 11 (*)     ALT <7 (*)     Alkaline Phosphatase 136 (*)     All other components within normal limits   CBC WITH DIFFERENTIAL WITH  PLATELET - Abnormal; Notable for the following components:    RBC 2.58 (*)     HGB 8.3 (*)     HCT 23.7 (*)     Lymphocyte Absolute 0.95 (*)     All other components within normal limits   SARS-COV-2/FLU A AND B/RSV BY PCR (GENEXPERT) - Abnormal; Notable for the following components:    RSV by PCR Positive (*)    XR CHEST AP PORTABLE  (CPT=71045)  Result Date: 1/4/2025  CONCLUSION:  Normal cardiac and mediastinal contours.  No pulmonary edema or focal airspace consolidation.  The pleural spaces are clear.   LOCATION:  [facility]      Dictated by (CST): Alec Oliva MD on 1/04/2025 at 7:08 PM     Finalized by (CST): Alec Oliva MD on 1/04/2025 at 7:09 PM       CT BRAIN OR HEAD (CPT=70450)  Result Date: 1/4/2025  CONCLUSION:   No acute intracranial process identified.    LOCATION:  [facility]   Dictated by (CST): Alec Oliva MD on 1/04/2025 at 6:55 PM     Finalized by (CST): Alec Oliva MD on 1/04/2025 at 6:58 PM          Disposition and Plan     Clinical Impression:  1. Encephalopathy due to other virus    2. Weakness generalized    3. FTT (failure to thrive) in adult      Disposition:  Admit      1/5/25  H&P   History of Present Illness: Patient is a 64 year old male with history of ESRD on HD, hypertension, hyperlipidemia, type 2 diabetes, and CVA who presented for altered mentation.  Reportedly, patient has not been eating or following commands per baseline.  Reportedly, due to CVA complication he is bedbound and nonverbal at baseline per patients POA.  He does open eyes to verbal cues.      Patient hypertensive on presentation, satting well on room air.  Labs with leukocytosis and renal function consistent with ESRD.  RSV positive.  CXR without acute infiltrates and CT head without acute intracranial process.  Patient admitted for further management.     Pulm: Clear breath sounds bilaterally.  Normal respiratory effort.  CV: Regular rate and rhythm, no murmur.   Abd: Soft, nontender, nondistended.  MSK: No obvious  deformities.    Skin: No lesions or rashes.  Neuro: Nonverbal, does not follow commands.     Lab Results   Component Value Date     WBC 13.4 01/05/2025     HGB 8.6 01/05/2025     HCT 25.1 01/05/2025     .0 01/05/2025     CREATSERUM 6.17 01/05/2025     BUN 37 01/05/2025      01/05/2025     K 4.4 01/05/2025     CL 94 01/05/2025     CO2 30.0 01/05/2025      01/05/2025     CA 9.8 01/05/2025     ALB 3.7 01/05/2025     ALKPHO 141 01/05/2025     BILT 0.3 01/05/2025     TP 6.6 01/05/2025     AST 13 01/05/2025     ALT <7 01/05/2025     MG 2.2 01/04/2025     PHOS 3.2 01/04/2025     PGLU 147 01/05/2025      Assessment/ Plan:      #RSV  #Acute hypoxia with mucus plugging  #Leukocytosis   #Acute metabolic encephalopathy  -CXR without infiltrates.  CT head negative.  -Supportive therapy,  nebs, bronchial hygiene.  -Will maintain patient NPO for now due to mentation.     #Hypertension  #Hyperlipidemia  #ESRD on HD  #Type 2 diabetes  -Holding home PO until mentation improves.  Discussed with bedside RN.  -Sliding scale coverage as patient is with minimal oral intake.  -Nephrology on consult for HD management     #Advanced care planning  -Discussed with patients POA/ Maria Del Rosario. Updated code status to DNAR / DNI.     DVT ppx: Heparin subcu  Diet: NPO               1/6/25        SUBJECTIVE: PT not responding today      Temp:  [98.1 °F (36.7 °C)-102.7 °F (39.3 °C)] 99 °F (37.2 °C)  Pulse:  [55-98] 74  Resp:  [16-18] 18  BP: ()/(44-67) 97/47  SpO2:  [91 %-98 %] 95 %  Exam  Gen: lethargic  Pulm: Lungs clear bilaterally, normal respiratory effort  CV: Heart with regular rate and rhythm, no murmur.    Abd: Abdomen soft, nontender, nondistended, bowel sounds present  MSK: no significant pitting edema or tenderness of the LE  Skin: no new rashes or lesions     Lab 01/04/25  1730 01/05/25  0651 01/06/25  0638   WBC 7.0 13.4* 11.0   HGB 8.3* 8.6* 9.2*   MCV 91.9 93.0 92.5   .0 233.0 214.0      Lab 01/04/25  1731  01/05/25  0651 01/06/25  0638   * 133* 137   K 4.6 4.4 5.0   CL 92* 94* 97*   CO2 31.0 30.0 27.0   BUN 31* 37* 59*   CREATSERUM 5.36* 6.17* 8.10*   CA 9.7 9.8 9.7   MG 2.2  --   --    PHOS 3.2  --  2.8   * 127* 150*      Lab 01/04/25  1731 01/05/25  0651 01/06/25  0638   ALT <7* <7* <7*   AST 17 13 14   ALB 3.7 3.7 3.8      Lab 01/05/25  0623 01/05/25  1203 01/05/25  1705 01/05/25  2114 01/06/25  0544   PGLU 147* 145* 139* 163* 164*       Meds:   Scheduled:    insulin aspart  1-5 Units Subcutaneous TID AC and HS    aspirin  81 mg Oral Daily    atorvastatin  80 mg Oral Nightly    calcitriol  0.25 mcg Oral Once per day on Monday Wednesday Friday    clopidogrel  75 mg Oral Daily    heparin  5,000 Units Subcutaneous 2 times per day       Assessment/ Plan:      #RSV  #Acute hypoxia with mucus plugging  #Leukocytosis   #Acute metabolic encephalopathy  -CXR without infiltrates.  CT head negative.  -Supportive therapy,  nebs, bronchial hygiene.  -Will maintain patient NPO for now due to mentation.  -will consider neuro evaluation if no improvement in 1-2 days     #Hypertension  #Hyperlipidemia  #ESRD on HD  #Type 2 diabetes  -Holding home PO until mentation improves.  Discussed with bedside RN.  -Sliding scale coverage as patient is with minimal oral intake.  -Nephrology on consult for HD management     #Fever  -likely due to ciral infection  -check blood cx     #Advanced care planning  -Discussed with patients POA/ Maria Del Rosario. Updated code status to DNAR / DNI.     DVT ppx: Heparin subcu  Diet: NPO                        MEDICATIONS ADMINISTERED IN LAST 1 DAY:  acetaminophen (Tylenol) rectal suppository 650 mg       Date Action Dose Route User    1/7/2025 0620 Given 650 mg Rectal Myla Daniel, RN          albumin human (Albumin) 25% injection 25 g       Date Action Dose Route User    1/6/2025 1530 New Bag 25 g Intravenous Briana Little RN          heparin (Porcine) 5000 UNIT/ML injection 5,000 Units       Date  Action Dose Route User    1/6/2025 2000 Given 5,000 Units Subcutaneous (Left Upper Arm) Myla Daniel RN    1/6/2025 0940 Given 5,000 Units Subcutaneous (Right Lower Abdomen) Sofia Ordoñez RN          insulin aspart (NovoLOG) 100 Units/mL FlexPen 1-5 Units       Date Action Dose Route User    1/7/2025 0544 Given 2 Units Subcutaneous (Left Upper Arm) yMla Daniel RN    1/6/2025 2153 Given 1 Units Subcutaneous (Left Upper Arm) Myla Daniel RN    1/6/2025 1820 Given 1 Units Subcutaneous (Left Upper Arm) Briana Little RN            Vitals (last day)       Date/Time Temp Pulse Resp BP SpO2 Weight O2 Device O2 Flow Rate (L/min) [facility]    01/07/25 0643 -- -- -- -- 92 % -- Nasal cannula 2 L/min AM    01/07/25 0640 -- -- -- -- 85 % -- None (Room air) -- AM    01/07/25 0554 -- -- -- -- 93 % -- None (Room air) -- AM    01/07/25 0553 100.4 °F (38 °C) 77 -- 169/55 93 % -- Nasal cannula 2 L/min AM    01/07/25 0031 98.8 °F (37.1 °C) 68 20 140/55 97 % -- Nasal cannula 2 L/min     01/06/25 1959 98.7 °F (37.1 °C) 72 12 123/47 92 % -- Nasal cannula 2 L/min AM    01/06/25 1629 97.8 °F (36.6 °C) 72 16 104/55 92 % -- High flow nasal cannula 6 L/min KM    01/06/25 1303 99 °F (37.2 °C) 74 18 97/47 95 % -- High flow nasal cannula 6 L/min KM    01/06/25 0708 -- 98 -- -- 91 % -- Nasal cannula 5 L/min AF    01/06/25 0541 102.7 °F (39.3 °C) 92 18 152/67 94 % -- None (Room air) -- LR    01/06/25 0045 98.9 °F (37.2 °C) 68 18 149/58 96 % -- None (Room air) -- LR

## 2025-01-07 NOTE — HOSPICE RN NOTE
1525 [facility] new referral received. AIDIN sent per Hospital CM. Hospice will reach out to POA to set up a time to meet for tomorrow.    1540 Spoke to ELLEN Mix. Plan for hospice meeting tomorrow, 10am at the bedside.    Milagros Gray RN, BSN CHPN  Transitional Nurse Liaison  [facility]  [phone] (After-hours)

## 2025-01-08 ENCOUNTER — APPOINTMENT (OUTPATIENT)
Dept: GENERAL RADIOLOGY | Facility: HOSPITAL | Age: 65
End: 2025-01-08
Attending: RADIOLOGY
Payer: MEDICARE

## 2025-01-08 ENCOUNTER — NURSE ONLY (OUTPATIENT)
Dept: ELECTROPHYSIOLOGY | Facility: HOSPITAL | Age: 65
End: 2025-01-08
Payer: MEDICARE

## 2025-01-08 ENCOUNTER — APPOINTMENT (OUTPATIENT)
Dept: GENERAL RADIOLOGY | Facility: HOSPITAL | Age: 65
End: 2025-01-08
Attending: HOSPITALIST
Payer: MEDICARE

## 2025-01-08 LAB
ALBUMIN SERPL-MCNC: 4 G/DL (ref 3.2–4.8)
ALBUMIN/GLOB SERPL: 1.3 {RATIO} (ref 1–2)
ALP LIVER SERPL-CCNC: 122 U/L
ALT SERPL-CCNC: <7 U/L
AMMONIA PLAS-MCNC: 14 UMOL/L (ref 11–32)
ANION GAP SERPL CALC-SCNC: 14 MMOL/L (ref 0–18)
AST SERPL-CCNC: 12 U/L (ref ?–34)
BASOPHILS # BLD AUTO: 0.01 X10(3) UL (ref 0–0.2)
BASOPHILS NFR BLD AUTO: 0.1 %
BILIRUB SERPL-MCNC: 0.5 MG/DL (ref 0.2–1.1)
BUN BLD-MCNC: 73 MG/DL (ref 9–23)
CALCIUM BLD-MCNC: 9.7 MG/DL (ref 8.7–10.4)
CHLORIDE SERPL-SCNC: 103 MMOL/L (ref 98–112)
CO2 SERPL-SCNC: 24 MMOL/L (ref 21–32)
CREAT BLD-MCNC: 8.06 MG/DL
EGFRCR SERPLBLD CKD-EPI 2021: 7 ML/MIN/1.73M2 (ref 60–?)
EOSINOPHIL # BLD AUTO: 0.01 X10(3) UL (ref 0–0.7)
EOSINOPHIL NFR BLD AUTO: 0.1 %
ERYTHROCYTE [DISTWIDTH] IN BLOOD BY AUTOMATED COUNT: 15.9 %
GLOBULIN PLAS-MCNC: 3.1 G/DL (ref 2–3.5)
GLUCOSE BLD-MCNC: 116 MG/DL (ref 70–99)
GLUCOSE BLD-MCNC: 141 MG/DL (ref 70–99)
GLUCOSE BLD-MCNC: 194 MG/DL (ref 70–99)
GLUCOSE BLD-MCNC: 205 MG/DL (ref 70–99)
GLUCOSE BLD-MCNC: 206 MG/DL (ref 70–99)
HCT VFR BLD AUTO: 26.9 %
HGB BLD-MCNC: 8.7 G/DL
IMM GRANULOCYTES # BLD AUTO: 0.03 X10(3) UL (ref 0–1)
IMM GRANULOCYTES NFR BLD: 0.3 %
LYMPHOCYTES # BLD AUTO: 0.65 X10(3) UL (ref 1–4)
LYMPHOCYTES NFR BLD AUTO: 7.5 %
MAGNESIUM SERPL-MCNC: 2.5 MG/DL (ref 1.6–2.6)
MCH RBC QN AUTO: 31.1 PG (ref 26–34)
MCHC RBC AUTO-ENTMCNC: 32.3 G/DL (ref 31–37)
MCV RBC AUTO: 96.1 FL
MONOCYTES # BLD AUTO: 0.67 X10(3) UL (ref 0.1–1)
MONOCYTES NFR BLD AUTO: 7.8 %
NEUTROPHILS # BLD AUTO: 7.26 X10 (3) UL (ref 1.5–7.7)
NEUTROPHILS # BLD AUTO: 7.26 X10(3) UL (ref 1.5–7.7)
NEUTROPHILS NFR BLD AUTO: 84.2 %
OSMOLALITY SERPL CALC.SUM OF ELEC: 319 MOSM/KG (ref 275–295)
PHOSPHATE SERPL-MCNC: 4.6 MG/DL (ref 2.4–5.1)
PLATELET # BLD AUTO: 183 10(3)UL (ref 150–450)
POTASSIUM SERPL-SCNC: 4.6 MMOL/L (ref 3.5–5.1)
PROT SERPL-MCNC: 7.1 G/DL (ref 5.7–8.2)
RBC # BLD AUTO: 2.8 X10(6)UL
SODIUM SERPL-SCNC: 141 MMOL/L (ref 136–145)
TSI SER-ACNC: 1.09 UIU/ML (ref 0.55–4.78)
WBC # BLD AUTO: 8.6 X10(3) UL (ref 4–11)

## 2025-01-08 PROCEDURE — 71045 X-RAY EXAM CHEST 1 VIEW: CPT | Performed by: HOSPITALIST

## 2025-01-08 PROCEDURE — 99223 1ST HOSP IP/OBS HIGH 75: CPT | Performed by: OTHER

## 2025-01-08 PROCEDURE — 71045 X-RAY EXAM CHEST 1 VIEW: CPT | Performed by: RADIOLOGY

## 2025-01-08 PROCEDURE — 95816 EEG AWAKE AND DROWSY: CPT | Performed by: OTHER

## 2025-01-08 PROCEDURE — 5A0935A ASSISTANCE WITH RESPIRATORY VENTILATION, LESS THAN 24 CONSECUTIVE HOURS, HIGH NASAL FLOW/VELOCITY: ICD-10-PCS | Performed by: HOSPITALIST

## 2025-01-08 RX ORDER — LACOSAMIDE 10 MG/ML
100 INJECTION, SOLUTION INTRAVENOUS EVERY 12 HOURS
Status: DISCONTINUED | OUTPATIENT
Start: 2025-01-08 | End: 2025-01-10

## 2025-01-08 RX ORDER — LACOSAMIDE 10 MG/ML
100 INJECTION, SOLUTION INTRAVENOUS EVERY 12 HOURS
Status: DISCONTINUED | OUTPATIENT
Start: 2025-01-08 | End: 2025-01-08

## 2025-01-08 RX ORDER — LACOSAMIDE 10 MG/ML
100 INJECTION, SOLUTION INTRAVENOUS EVERY 12 HOURS
Status: DISCONTINUED | OUTPATIENT
Start: 2025-01-09 | End: 2025-01-08

## 2025-01-08 RX ORDER — IPRATROPIUM BROMIDE AND ALBUTEROL SULFATE 2.5; .5 MG/3ML; MG/3ML
3 SOLUTION RESPIRATORY (INHALATION)
Status: DISCONTINUED | OUTPATIENT
Start: 2025-01-08 | End: 2025-01-10

## 2025-01-08 NOTE — PLAN OF CARE
Pt a/o x0. Nonverbal. Stuporous. VSS. O2 needs fluctuating throughout the night. See VS flowsheets. Suctioning w/ Yankauer & deep suction prn. Meds per MAR. R AVF.     Fall risk protocol followed, call light within reach.

## 2025-01-08 NOTE — DIETARY NOTE
[facility]   part of [facility]    NUTRITION ASSESSMENT    Unable to diagnose malnutrition criteria at this time.    NUTRITION INTERVENTION:    Enteral Nutrition - Via NG Recommend starting Nepro 1.8 at 30 mL/hr advancing 10 mL/hr q 6 hours to goal rate of 50 mL/hr.   This will provide 2160 kcal, 97 grams protein, 876 mL total free water, and 100% of RDI's.   Recommend 170 mL water flush q 4 hours, TF+FWF provides 1896 mL total fluids. Or flush per Neph MD  Coordination of Nutrition Care - SLP consult prior to diet advancement.    PATIENT STATUS: 01/08/25 Pt admitted with encephalopathy. RD screened due to consult for DHT / tube feeds. No family present, pt with respiratory. RD consulted for enteral nutrition recs. Recs as per above. Noted hospice on consult. Follow for intakes, tolerances, wts, GOC     PMH:  ESRD on HD, hypertension, hyperlipidemia, type 2 diabetes, and CVA     ANTHROPOMETRICS:  Ht: 172 cm (5' 7.72\")  Wt: 77 kg (169 lb 12.1 oz).   BMI: Body mass index is 26.03 kg/m².  IBW: 67 kg    WEIGHT HISTORY:   Wt Readings from Last 10 Encounters:   01/04/25 77 kg (169 lb 12.1 oz)   03/24/22 77 kg (169 lb 12.8 oz)   12/09/21 76.1 kg (167 lb 12.8 oz)   10/08/21 74.8 kg (165 lb)   09/30/21 73.9 kg (163 lb)   07/01/21 75.3 kg (166 lb)   05/20/21 70.8 kg (156 lb)   05/17/21 70.3 kg (155 lb)   04/08/21 77.1 kg (170 lb)   03/09/21 73 kg (161 lb)      NUTRITION:  Diet:       Procedures    NPO      Food Allergies: No  Cultural/Ethnic/Anglican Preferences Addressed: Yes    Percent Meals Eaten (last 3 days)       None          GI system review: WNL Last BM Date: 01/07/25  Skin and wounds: WNL    NUTRITION RELATED PHYSICAL FINDINGS:     1. Body Fat/Muscle Mass: unable to assess pt with other services      2. Fluid Accumulation: none    NUTRITION PRESCRIPTION:  77 kg Actual Body Weight  Calories: 8445-5406 calories/day (25-30 kcal/kg)  Protein:  grams protein/day (1.2-1.5 grams protein per kg)  Fluid: ~1  ml/kcal or per MD discretion    NUTRITION DIAGNOSIS/PROBLEM:  Inadequate oral intake related to physiological causes as evidenced by documented/reported insufficient oral intake , need for enteral nutrition    MONITOR AND EVALUATE/NUTRITION GOALS:  Weight stable within 1 to 2 lbs during admission - New  Start alternative nutrition in 24-48 hrs if diet is not able to advance- New  Tolerate alternative nutrition at 100% of goal - New    MEDICATIONS:  insulin    LABS:  Reviewed    Pt is at High nutrition risk    Mariam Alberto RD, LDN  Clinical Dietitian

## 2025-01-08 NOTE — PLAN OF CARE
Pt received A&Ox0. Nonverbal, only opening eyes. VSS. Afebrile. No s/s of pain. PIV saline locked per orders. IV abx. Hospice meeting today. Pt family declined at this time-not ready to stop care/dialysis. MD notified. Dobhoff placement not successful. MD aware, to be placed in Xray tomorrow morning. Consulted nutrition. Feeds will start when dobhoff is in. EEG complete, see results. Dialysis today. Per dialysis nurse, none off. Mouth suctioned throughout the day. Family updated on POC. Fall and isolation precautions maintained. Call light within reach.    1530: MRI brain ordered. Screening to be complete when family is in the room.    Problem: Diabetes/Glucose Control  Goal: Glucose maintained within prescribed range  Description: INTERVENTIONS:  - Monitor Blood Glucose as ordered  - Assess for signs and symptoms of hyperglycemia and hypoglycemia  - Administer ordered medications to maintain glucose within target range  - Assess barriers to adequate nutritional intake and initiate nutrition consult as needed  - Instruct patient on self management of diabetes  Outcome: Progressing

## 2025-01-08 NOTE — PROGRESS NOTES
[facility] Hospitalist Progress Note                                                                     [facility]   part of [facility]      Bg Watkins  10/27/1960    SUBJECTIVE: PT not responding today still but does open eyes to stimuli     OBJECTIVE:  Temp:  [98.8 °F (37.1 °C)-101.8 °F (38.8 °C)] 100.7 °F (38.2 °C)  Pulse:  [] 83  Resp:  [16-20] 16  BP: (140-187)/(52-74) 156/54  SpO2:  [85 %-99 %] 97 %  Exam  Gen: No acute distress, lethargic  Pulm: Lungs clear bilaterally, normal respiratory effort  CV: Heart with regular rate and rhythm, no murmur.    Abd: Abdomen soft, nontender, nondistended, bowel sounds present  MSK: no significant pitting edema or tenderness of the LE  Skin: no new rashes or lesions    Labs:   Recent Labs   Lab 01/04/25 1730 01/05/25 0651 01/06/25  0638 01/07/25  0703   WBC 7.0 13.4* 11.0 10.8   HGB 8.3* 8.6* 9.2* 8.6*   MCV 91.9 93.0 92.5 95.3   .0 233.0 214.0 174.0       Recent Labs   Lab 01/04/25 1731 01/05/25 0651 01/06/25  0638 01/07/25  0703   * 133* 137 140   K 4.6 4.4 5.0 4.2   CL 92* 94* 97* 101   CO2 31.0 30.0 27.0 27.0   BUN 31* 37* 59* 44*   CREATSERUM 5.36* 6.17* 8.10* 5.85*   CA 9.7 9.8 9.7 9.8   MG 2.2  --   --  2.3   PHOS 3.2  --  2.8 3.6   * 127* 150* 165*       Recent Labs   Lab 01/04/25 1731 01/05/25 0651 01/06/25  0638 01/07/25  0703   ALT <7* <7* <7* <7*   AST 17 13 14 16   ALB 3.7 3.7 3.8 4.0       Recent Labs   Lab 01/06/25  1823 01/06/25  2132 01/07/25  0531 01/07/25  1214 01/07/25  1543   PGLU 148* 155* 184* 176* 162*       Meds:   Scheduled:    insulin aspart  1-5 Units Subcutaneous TID AC and HS    aspirin  81 mg Oral Daily    atorvastatin  80 mg Oral Nightly    calcitriol  0.25 mcg Oral Once per day on Monday Wednesday Friday    clopidogrel  75 mg Oral Daily    heparin  5,000 Units Subcutaneous 2 times per day     Continuous Infusions:   PRN:   acetaminophen    hydrALAzine     acetaminophen    melatonin    Assessment/ Plan:      #RSV  #Acute hypoxia with mucus plugging  #Leukocytosis   #Acute metabolic encephalopathy  -CXR without infiltrates.  CT head negative.  -Supportive therapy,  nebs, bronchial hygiene.  -Will maintain patient NPO for now due to mentation.  -will consider neuro evaluation if no improvement      #Hypertension  #Hyperlipidemia  #ESRD on HD  #Type 2 diabetes  -Holding home PO until mentation improves.  Discussed with bedside RN.  -Sliding scale coverage as patient is with minimal oral intake.  -Nephrology on consult for HD management    #Fever  -likely due to ciral infection  -check blood cx--> pos --> ID consult, 1 dose of iv vanco     #Advanced care planning  -Discussed with patients POA/ Maria Del Rosario. Updated code status to DNAR / DNI.     DVT ppx: Heparin subcu  Diet: NPO   Code status: DNAR/ DNI  Disposition: No discharge    Phillip Sherwood MD  [facility] Hospitalist  [phone]

## 2025-01-08 NOTE — CONSULTS
[facility]   part of [facility] Infectious Disease Consult    Bg Watkins Patient Status:  Inpatient    10/27/1960 MRN [id number]   Location [facility] 4NW-A Attending Duran Redding MD   Hosp Day # 4 PCP April Simmons MD     Reason for Consultation:  To help with infection and treatment, requested by Dr. Redding,     History of Present Illness:  Bg Watkins is a 64 year old male admitted to [facility] on  with AMS,   Significant hx of   - CVA, bed bound,   - DM,  - ESRD, On HD,   Admitted from home due to AMS, Work up on admission + for RSV infection,   Was being managed conservatively when he had temp spike on  for which blood cul were done,   Blood cul are now growing Staph Hominis, ID is asked to help with infection and treatment,         History:  Past Medical History:    Acute pulmonary edema (HCC)    DIABETES    HTN (hypertension), benign    HYPERLIPIDEMIA    Stroke (HCC)    basil ganglia stroke    Tobacco use disorder     Past Surgical History:   Procedure Laterality Date    Cataract Right 2018    [facility] IOL    Cataract Left 05/10/2018    Tej Trent IOL    Other  1990    left foot surgery     Family History   Problem Relation Age of Onset    Diabetes Father     Hypertension Father     Diabetes Mother       reports that he quit smoking about 3 years ago. He has never used smokeless tobacco. He reports current alcohol use. He reports that he does not use drugs.    Allergies:  Allergies[1]    Medications:    Current Facility-Administered Medications:     lacosamide (Vimpat) 200 mg/20mL injection 100 mg, 100 mg, Intravenous, Q12H    acetaminophen (Tylenol) rectal suppository 650 mg, 650 mg, Rectal, Q6H PRN    hydrALAzine (Apresoline) 20 mg/mL injection 10 mg, 10 mg, Intravenous, Q6H PRN    insulin aspart (NovoLOG) 100 Units/mL FlexPen 1-5 Units, 1-5 Units, Subcutaneous, TID AC and HS    aspirin  tab 81 mg, 81 mg, Oral, Daily     atorvastatin (Lipitor) tab 80 mg, 80 mg, Oral, Nightly    calcitriol (Rocaltrol) cap 0.25 mcg, 0.25 mcg, Oral, Once per day on Monday Wednesday Friday    clopidogrel (Plavix) tab 75 mg, 75 mg, Oral, Daily    acetaminophen (Tylenol Extra Strength) tab 500 mg, 500 mg, Oral, Q4H PRN    melatonin tab 3 mg, 3 mg, Oral, Nightly PRN    heparin (Porcine) 5000 UNIT/ML injection 5,000 Units, 5,000 Units, Subcutaneous, 2 times per day    Review of Systems:   Constitutional: Negative for anorexia, chills, fatigue, fevers, malaise, night sweats and weight loss.  Eyes: Negative for visual disturbance, irritation and redness.  Ears, nose, mouth, throat, and face: Negative for hearing loss, tinnitus, nasal congestion, snoring, sore throat, hoarseness and voice change.  Respiratory: Negative for cough, sputum, hemoptysis, chest pain, wheezing, dyspnea on exertion, or stridor.  Cardiovascular: Negative for chest pain, palpitations, irregular heart beats, syncope, fatigue, orthopnea, paroxysmal nocturnal dyspnea, lower extremity edema.  Gastrointestinal: Negative for dysphagia, odynophagia, reflux symptoms, nausea, vomiting, change in bowel habits, diarrhea, constipation and abdominal pain.  Integument/breast: Negative for rash, skin lesions, and pruritus.  Hematologic/lymphatic: Negative for easy bruising, bleeding, and lymphadenopathy.  Musculoskeletal: Negative for myalgias, arthralgias, muscle weakness.  Neurological: Negative for headaches, dizziness, seizures, memory problems, trouble swallowing, speech problems, gait problems and weakness.  Behavioral/Psych: Negative for active tobacco use.  Endocrine: No history of of diabetes, thyroid disorder.  Unable to obtain,     Vital signs in last 24 hours:  Patient Vitals for the past 24 hrs:   BP Temp Temp src Pulse Resp SpO2 Height   01/08/25 1313 118/55 -- Axillary 77 22 90 % --   01/08/25 1251 -- -- -- 86 -- 95 % --   01/08/25 1109 -- -- -- -- -- 97 % --   01/08/25 1018 131/54 99  °F (37.2 °C) Oral 75 (!) 28 98 % --   01/08/25 0750 -- -- -- -- -- 98 % --   01/08/25 0530 -- 97.8 °F (36.6 °C) Axillary 91 -- 92 % --   01/08/25 0417 140/58 99.6 °F (37.6 °C) Oral 84 -- 93 % --   01/08/25 0409 -- -- -- -- -- (!) 81 % --   01/08/25 0408 -- -- -- -- -- (!) 80 % --   01/08/25 0320 -- -- -- -- -- 92 % --   01/08/25 0221 -- -- -- -- -- 96 % --   01/08/25 0210 -- -- -- -- -- (!) 79 % --   01/08/25 0000 151/64 98.1 °F (36.7 °C) Axillary 87 16 92 % --   01/07/25 2004 156/54 (!) 100.7 °F (38.2 °C) Oral 83 16 97 % 5' 7.72\" (1.72 m)   01/07/25 1709 -- -- -- 100 -- 97 % --   01/07/25 1548 (!) 187/74 (!) 101.8 °F (38.8 °C) Axillary 93 16 99 % --       Physical Exam:   General: alert, cooperative, oriented.  No respiratory distress.   Head: Normocephalic, without obvious abnormality, atraumatic.   Eyes: Conjunctivae/corneas clear.  No scleral icterus.  No conjunctival     hemorrhage.   Nose: Nares normal.   Throat: Lips, mucosa, and tongue normal.  No thrush noted.   Neck: Soft, supple neck; trachea midline, no adenopathy, no thyromegaly.   Lungs: CTAB, normal and equal bilateral chest rise   Chest wall: No tenderness or deformity.   Heart: Regular rate and rhythm, normal S1S2, no murmur.   Abdomen: soft, non-tender, non-distended, no masses, no guarding, no     rebound, positive BS.   Extremity: no edema, no cyanosis   Skin: No rashes or lesions.   Neurological: Alert, interactive, no focal deficits    Labs:  Lab Results   Component Value Date    WBC 8.6 01/08/2025    HGB 8.7 01/08/2025    HCT 26.9 01/08/2025    .0 01/08/2025    CREATSERUM 8.06 01/08/2025    BUN 73 01/08/2025     01/08/2025    K 4.6 01/08/2025     01/08/2025    CO2 24.0 01/08/2025     01/08/2025    CA 9.7 01/08/2025    ALB 4.0 01/08/2025    ALKPHO 122 01/08/2025    BILT 0.5 01/08/2025    TP 7.1 01/08/2025    AST 12 01/08/2025    ALT <7 01/08/2025    TSH 1.088 01/08/2025    MG 2.5 01/08/2025    PHOS 4.6 01/08/2025        Radiology:  CXR- Mild left basilar opacity may represent early infiltrate.  Correlate clinically       Cultures:  Reviewed     Assessment and Plan:    Bacteremia: with blood cul growing Staph Hominis in 1/2 bottles,   - Previously blood cul on 1/04 are NGTD  - RUE Fistula site is clean, no murmur,   - Follow repeat blood cul, clinically appear to be contamination,     2.   Fevers: on 1/06 and 1/07,   - Was afebrile on 1/04 and 1/05,   - RSV Can be responsible for fevers too,   - CXR with possible LLL infiltrate,   - Hypoxic and is on supplemental O2,   - Aspiration stays possible, will add IV Unasyn, change to PO when able to take orally,     3.    ESRD: On HD, Gets it through RUE Fistula,     4.    Hx of CVA: Bed bound status: Not interactive, but this is not his baseline,     5.    Disposition: in house a middle aged male with hx of CVA, ESRD, admitted with AMS due to RSV infection,   - Follow pending cul,   - Repeat blood cul,   - No need of IV Vanc,   - Add IV Unasyn, pharm to dose,   - Aspiration Precautions,     Discussed with RN, all questions answered, further recommendations to follow, Thanks,   Thank you for consulting [facility] for Bg Watkins.  If you have any questions or concerns please call [facility] and [facility] Infectious Disease at [phone].     Lee Ann Pantoja MD  1/8/2025  1:52 PM         [1] No Known Allergies

## 2025-01-08 NOTE — CONSULTS
[facility]   NEUROLOGY   CONSULT NOTE    Admission date: 1/4/2025  Reason for Consult: Altered mental status.  Chief Complaint: No chief complaint on file.  ________________________________________________________________    History     History of Presenting Illness  64 year old male with multiple medical problems including diabetes, hypertension, hyperlipidemia, history of stroke, end-stage renal disease on hemodialysis secondary diabetes currently being treated for RSV infection, hypertension, end-stage renal disease, consulted for altered mental status.  Patient when seen was opening eyes to loud verbal stimulation and tracking as well as intermittently following some commands.  Mostly nonverbal.  No family members available at the time.  No witnessed episode of seizure, tongue biting or jerking most of the upper or lower extremity noted.  Patient was due to be switched to hospice care but earlier when family members met hospice nurse, they decided to continue care including dialysis and NG tube.    History obtained from nursing staff, chart review, palliative care physician, hospice nurse.    Past Medical History:    Acute pulmonary edema (HCC)    DIABETES    HTN (hypertension), benign    HYPERLIPIDEMIA    Stroke (HCC)    basil ganglia stroke    Tobacco use disorder     Past Surgical History:   Procedure Laterality Date    Cataract Right 04/12/2018    [facility] IOL    Cataract Left 05/10/2018    [facility] IOL    Other  1990    left foot surgery     Social History     Socioeconomic History    Marital status: Single    Number of children: 0   Occupational History    Occupation:    Tobacco Use    Smoking status: Former     Current packs/day: 0.00     Types: Cigarettes     Quit date: 3/1/2021     Years since quitting: 3.8    Smokeless tobacco: Never    Tobacco comments:     14 cigarettes a day   Substance and Sexual Activity    Alcohol use: Yes     Alcohol/week: 0.0  standard drinks of alcohol    Drug use: No    Sexual activity: Not Currently   Social History Narrative    . Single.     Social Drivers of Health     Food Insecurity: Unknown (1/4/2025)    Food Insecurity     Food Insecurity: Patient unable to answer   Transportation Needs: Unknown (1/4/2025)    Transportation Needs     Lack of Transportation: Patient unable to answer   Housing Stability: Unknown (1/4/2025)    Housing Stability     Housing Instability: Patient unable to answer     Family History   Problem Relation Age of Onset    Diabetes Father     Hypertension Father     Diabetes Mother      Allergies Allergies[1]    Home Meds  Current Outpatient Medications   Medication Instructions    amLODIPine (NORVASC) 5 mg, Oral, Daily    aspirin 81 mg, Daily    atorvastatin (LIPITOR) 80 mg, Oral, Nightly    calcitriol (ROCALTROL) 0.25 mcg, Oral, Three times weekly, Please take one tablet on Monday, Wednesday, and Friday.    carvedilol (COREG) 25 mg, Oral, 2 times daily    clopidogrel (PLAVIX) 75 mg, Oral, Daily    ergocalciferol 1.25 MG (30433 UT) Oral Cap TAKE 1 CAPSULE BY MOUTH ONCE A WEEK EVERY WEDNESDAY.    Glucose Blood (CONTOUR NEXT TEST) In Vitro Strip Check TID    hydrALAZINE (APRESOLINE) 25 mg, Oral, 3 times daily    indapamide (LOZOL) 2.5 mg, Oral, Every morning    Insulin Glargine-yfgn (SEMGLEE (YFGN)) 11 Units, Subcutaneous, Daily    Insulin Pen Needle (PEN NEEDLES) 31G X 6 MM Does not apply Misc 1 each, Does not apply, Daily    lisinopril (PRINIVIL; ZESTRIL) 40 mg, Oral, Daily    melatonin 5 mg, Nightly    Microlet Lancets ("[employer] MICROLET LANCETS) Does not apply Misc Use to test blood sugars three times daily    Microlet Lancets Does not apply Misc 1 each, Other, 2 times daily    pantoprazole (PROTONIX) 40 mg, Every morning before breakfast    Senna-Docusate Sodium 8.6-50 MG Oral Tab 1 tablet, Daily    SEVELAMER CARBONATE 800 MG Oral Tab TAKE 1 TABLET(800 MG) BY MOUTH THREE TIMES DAILY WITH MEALS      Scheduled Meds:   insulin aspart  1-5 Units Subcutaneous TID AC and HS    aspirin  81 mg Oral Daily    atorvastatin  80 mg Oral Nightly    calcitriol  0.25 mcg Oral Once per day on Monday Wednesday Friday    clopidogrel  75 mg Oral Daily    heparin  5,000 Units Subcutaneous 2 times per day     Continuous Infusions:  PRN Meds:  acetaminophen    hydrALAzine    acetaminophen    melatonin    OBJECTIVE   VITAL SIGNS:   Temp:  [97.8 °F (36.6 °C)-101.8 °F (38.8 °C)] 99 °F (37.2 °C)  Pulse:  [] 75  Resp:  [16-28] 28  BP: (131-187)/(52-74) 131/54  SpO2:  [79 %-99 %] 97 %    PHYSICAL EXAM:    NEUROLOGIC:    Mental Status: Awake, opening eyes, inconsistently following commands, nonverbal, not answering questions   Cranial nerves: PERRL.  EOMI.  Face symmetric.  Hearing grossly intact.    Motor: Squeezes fingers on request.  Otherwise motor exam is difficult to perform as patient not cooperative.  Not moving lower extremities to painful stimulation.  Reflexes decreased.    Sensation: Grimaces to painful stimulation.  Cerebellar: Unable to perform.      LABORATORY DATA:  Last 24 hour labs were reviewed in detail.  Recent Labs   Lab 01/06/25  0638 01/07/25  0703 01/08/25  0631    140 141   K 5.0 4.2 4.6   CL 97* 101 103   CO2 27.0 27.0 24.0   * 165* 194*   BUN 59* 44* 73*   CREATSERUM 8.10* 5.85* 8.06*     Recent Labs   Lab 01/06/25  0638 01/07/25  0703 01/08/25  0631   WBC 11.0 10.8 8.6   HGB 9.2* 8.6* 8.7*   .0 174.0 183.0     Recent Labs   Lab 01/06/25  0638 01/07/25  0703 01/08/25  0631   ALT <7* <7* <7*   AST 14 16 12     Recent Labs   Lab 01/04/25  1731 01/06/25  0638 01/07/25  0703 01/08/25  0631   MG 2.2  --  2.3 2.5   PHOS 3.2 2.8 3.6 4.6     Last A1c value was 8.5% done 9/30/2021.    Radiology:    CT scan of the head performed on 1/04 did not report any acute intracranial abnormalities.  ASSESSMENT/PLAN   64 year old male with:    Encephalopathy secondary to metabolic/toxic etiology,  complicated by underlying medical problems including uremia.  Possibility of seizures cannot be entirely excluded.  Advise EEG.  Also advised ammonia level and TSH.  Previous history of stroke.  Continue aspirin and statin for stroke prophylaxis.  OT/PT.  Dysphagia.  Patient has swallowing difficulties.  Consider NG tube/PEG tube placement.  Recent RSV infection with respiratory insufficiency.  Management as per hospitalist.  End-stage renal disease on hemodialysis.  Nephrology on the case.  Case discussed with patient's nursing staff, palliative care physician as well as hospice nurse.  No family members available.        Principal Problem:    Encephalopathy due to other virus  Active Problems:    Encephalopathy    Weakness generalized    FTT (failure to thrive) in adult    Palliative care encounter    Counseling regarding advance care planning and goals of care       Arthur Garzon MD  Neurohospitalist  [facility]    Disclaimer: This record was dictated using Dragon software. There may be errors due to voice recognition problems that were not realized and corrected during the completion of the note.           [1] No Known Allergies

## 2025-01-08 NOTE — PROCEDURES
EEG REPORT;    Reason for Examination: Encephalopathy.    Technical Summary:   18 Channels of EEG and 1 Channel of EKG was performed utilizing internation 10/20 method.        Background Activity:   The background activity consisted of 7-8 Hz waveforms, reactive to eye opening/ external stimulation.    Abnormality:  Throughout the recording mild, intermittent, generalized sharp wave activity was noted.  No electrographic seizures noted.  Throughout the recording low to medium voltage, polymorphic, 3 to 6 Hz slow activity was noted diffusely over both hemispheres      Activation:    Hyperventilation:   Not Performed.    Photic Stimulation:  Driving response seen.No       Sleep:  Stage I sleep seen.   Stage I and II sleep seen.    Impression:  Abnormal EEG.  Generalized sharp wave activity, as mentioned above were noted.  These waveforms are epileptogenic in nature.     Moderate diffuse slowing into delta and theta range was noted.  This constellation of findings can be seen in encephalopathy due to metabolic/toxic etiology, medication effects or diffuse cerebral injury.  Clinical correlation is recommended.        Arthur Garzon MD  [facility].

## 2025-01-08 NOTE — HOSPICE RN NOTE
[facility] nursing visit for follow up on hospice consult order. Met with several family members including Maria Del Rosario HUGHES. Discussed hospice benefit, hospice philosophy, palliative care with questions and concerns addressed. Hospice informational book was provided. Family is not ready to make a hospice decision at this time. They are not ready to stop hemodialysis. They would like to further discuss his status and options with physicians before making a decision on hospice. They are hopeful he can recover from this. Hospice will continue to follow. Please call [facility] with any questions or concerns.    Madelyn Medellin RN, [facility]  [facility] Liaison  [phone] after hours

## 2025-01-09 ENCOUNTER — APPOINTMENT (OUTPATIENT)
Dept: GENERAL RADIOLOGY | Facility: HOSPITAL | Age: 65
End: 2025-01-09
Attending: HOSPITALIST
Payer: MEDICARE

## 2025-01-09 LAB
ALBUMIN SERPL-MCNC: 3.9 G/DL (ref 3.2–4.8)
ALBUMIN/GLOB SERPL: 1.2 {RATIO} (ref 1–2)
ALP LIVER SERPL-CCNC: 128 U/L
ALT SERPL-CCNC: <7 U/L
ANION GAP SERPL CALC-SCNC: 18 MMOL/L (ref 0–18)
AST SERPL-CCNC: 16 U/L (ref ?–34)
BASOPHILS # BLD AUTO: 0.02 X10(3) UL (ref 0–0.2)
BASOPHILS NFR BLD AUTO: 0.3 %
BILIRUB SERPL-MCNC: 0.6 MG/DL (ref 0.2–1.1)
BUN BLD-MCNC: 41 MG/DL (ref 9–23)
CALCIUM BLD-MCNC: 9.7 MG/DL (ref 8.7–10.4)
CHLORIDE SERPL-SCNC: 96 MMOL/L (ref 98–112)
CO2 SERPL-SCNC: 25 MMOL/L (ref 21–32)
CREAT BLD-MCNC: 4.5 MG/DL
EGFRCR SERPLBLD CKD-EPI 2021: 14 ML/MIN/1.73M2 (ref 60–?)
EOSINOPHIL # BLD AUTO: 0.13 X10(3) UL (ref 0–0.7)
EOSINOPHIL NFR BLD AUTO: 1.8 %
ERYTHROCYTE [DISTWIDTH] IN BLOOD BY AUTOMATED COUNT: 16 %
GLOBULIN PLAS-MCNC: 3.3 G/DL (ref 2–3.5)
GLUCOSE BLD-MCNC: 137 MG/DL (ref 70–99)
GLUCOSE BLD-MCNC: 156 MG/DL (ref 70–99)
GLUCOSE BLD-MCNC: 181 MG/DL (ref 70–99)
GLUCOSE BLD-MCNC: 183 MG/DL (ref 70–99)
GLUCOSE BLD-MCNC: 187 MG/DL (ref 70–99)
GLUCOSE BLD-MCNC: 192 MG/DL (ref 70–99)
HCT VFR BLD AUTO: 36.7 %
HGB BLD-MCNC: 11.9 G/DL
IMM GRANULOCYTES # BLD AUTO: 0.03 X10(3) UL (ref 0–1)
IMM GRANULOCYTES NFR BLD: 0.4 %
LYMPHOCYTES # BLD AUTO: 1.04 X10(3) UL (ref 1–4)
LYMPHOCYTES NFR BLD AUTO: 14.5 %
MAGNESIUM SERPL-MCNC: 2.4 MG/DL (ref 1.6–2.6)
MCH RBC QN AUTO: 31.5 PG (ref 26–34)
MCHC RBC AUTO-ENTMCNC: 32.4 G/DL (ref 31–37)
MCV RBC AUTO: 97.1 FL
MONOCYTES # BLD AUTO: 0.6 X10(3) UL (ref 0.1–1)
MONOCYTES NFR BLD AUTO: 8.3 %
NEUTROPHILS # BLD AUTO: 5.37 X10 (3) UL (ref 1.5–7.7)
NEUTROPHILS # BLD AUTO: 5.37 X10(3) UL (ref 1.5–7.7)
NEUTROPHILS NFR BLD AUTO: 74.7 %
OSMOLALITY SERPL CALC.SUM OF ELEC: 300 MOSM/KG (ref 275–295)
PHOSPHATE SERPL-MCNC: 4.2 MG/DL (ref 2.4–5.1)
PLATELET # BLD AUTO: 185 10(3)UL (ref 150–450)
POTASSIUM SERPL-SCNC: 4.4 MMOL/L (ref 3.5–5.1)
PROT SERPL-MCNC: 7.2 G/DL (ref 5.7–8.2)
RBC # BLD AUTO: 3.78 X10(6)UL
SODIUM SERPL-SCNC: 139 MMOL/L (ref 136–145)
WBC # BLD AUTO: 7.2 X10(3) UL (ref 4–11)

## 2025-01-09 PROCEDURE — 44500 INTRO GASTROINTESTINAL TUBE: CPT | Performed by: HOSPITALIST

## 2025-01-09 PROCEDURE — 99232 SBSQ HOSP IP/OBS MODERATE 35: CPT

## 2025-01-09 PROCEDURE — 99231 SBSQ HOSP IP/OBS SF/LOW 25: CPT | Performed by: NURSE PRACTITIONER

## 2025-01-09 PROCEDURE — 74340 X-RAY GUIDE FOR GI TUBE: CPT | Performed by: HOSPITALIST

## 2025-01-09 RX ORDER — ALBUMIN (HUMAN) 12.5 G/50ML
25 SOLUTION INTRAVENOUS
Status: DISCONTINUED | OUTPATIENT
Start: 2025-01-09 | End: 2025-01-10

## 2025-01-09 NOTE — PHYSICAL THERAPY NOTE
Physical Therapy    Order for PT eval received.  Per staff, pt beginning to follow commands.  Attempted this p.m., but is currently going down for testing.  Will re-attempt as schedule and pt's medical stability allow.

## 2025-01-09 NOTE — PLAN OF CARE
Patient is alert, tracking RN in room. Patient following commands. No nonverbal signs of pain. 2L oxygen via nasal cannula. Speech consult placed per Dr. Sherwood. Patient to be evaluated by SLP prior to dobhoff placement - Radiology notified of updated plan of care. Patient able to tolerate nectar thick liquids and purees. Patient down to radiology with charge RN for dobhoff placement. CD contacted x2 for kangaroo pump, no available pumps, awaiting pump delivery to initiate tube feeds. Patient and family updated on plan of care.

## 2025-01-09 NOTE — PROGRESS NOTES
[facility]   part of Located within [facility]    Nephrology Progress Note    Bg Watkins Attending:  Duran Redding MD     Cc: esrd    SUBJECTIVE      Sleeping, no complaints noted     PHYSICAL EXAM   Vital signs: BP 95/51 (BP Location: Left arm)   Pulse 63   Temp 96.9 °F (36.1 °C) (Oral)   Resp 18   Ht 5' 7.72\" (1.72 m)   Wt 169 lb 12.1 oz (77 kg)   SpO2 100%   BMI 26.03 kg/m²   Temp (24hrs), Av.7 °F (37.1 °C), Min:96.9 °F (36.1 °C), Max:100.7 °F (38.2 °C)     No intake or output data in the 24 hours ending 25 1801  Wt Readings from Last 3 Encounters:   25 169 lb 12.1 oz (77 kg)   22 169 lb 12.8 oz (77 kg)   21 167 lb 12.8 oz (76.1 kg)     General: NAD  HEENT: NCAT, MMM  Neck: Supple   Cardiac: Regular rate and rhythm   Lungs: Coarse  Abdomen: Soft, non-tender, nondistended   Extremities: No edema  Neurologic: No asterxis  Skin: Warm and dry, no rashes     MEDS     Current Facility-Administered Medications   Medication Dose Route Frequency    [START ON 2025] ampicillin-sulbactam (Unasyn) 1.5 g in sodium chloride 0.9% 100mL IVPB-MBP  1.5 g Intravenous q12h    [START ON 2025] lacosamide (Vimpat) 200 mg/20mL injection 100 mg  100 mg Intravenous Q12H    acetaminophen (Tylenol) rectal suppository 650 mg  650 mg Rectal Q6H PRN    hydrALAzine (Apresoline) 20 mg/mL injection 10 mg  10 mg Intravenous Q6H PRN    insulin aspart (NovoLOG) 100 Units/mL FlexPen 1-5 Units  1-5 Units Subcutaneous TID AC and HS    aspirin DR tab 81 mg  81 mg Oral Daily    atorvastatin (Lipitor) tab 80 mg  80 mg Oral Nightly    calcitriol (Rocaltrol) cap 0.25 mcg  0.25 mcg Oral Once per day on     clopidogrel (Plavix) tab 75 mg  75 mg Oral Daily    acetaminophen (Tylenol Extra Strength) tab 500 mg  500 mg Oral Q4H PRN    melatonin tab 3 mg  3 mg Oral Nightly PRN    heparin (Porcine) 5000 UNIT/ML injection 5,000 Units  5,000 Units Subcutaneous 2 times per day       LABS     Lab  Results   Component Value Date    WBC 8.6 01/08/2025    HGB 8.7 01/08/2025    HCT 26.9 01/08/2025    .0 01/08/2025    CREATSERUM 8.06 01/08/2025    BUN 73 01/08/2025     01/08/2025    K 4.6 01/08/2025     01/08/2025    CO2 24.0 01/08/2025     01/08/2025    CA 9.7 01/08/2025    ALB 4.0 01/08/2025    ALKPHO 122 01/08/2025    BILT 0.5 01/08/2025    TP 7.1 01/08/2025    AST 12 01/08/2025    ALT <7 01/08/2025    TSH 1.088 01/08/2025    MG 2.5 01/08/2025    PHOS 4.6 01/08/2025    PGLU 116 01/08/2025       IMAGING   All imaging studies personally reviewed.    XR CHEST AP PORTABLE  (CPT=71045)   Final Result   PROCEDURE:  XR CHEST AP PORTABLE  (CPT=71045)       TECHNIQUE:  AP chest radiograph was obtained.       COMPARISON:  [facility] , XR, XR CHEST AP PORTABLE  (CPT=71045), 1/08/2025,    11:59 AM.       INDICATIONS:  Check for pneumo       PATIENT STATED HISTORY: (As transcribed by Technologist)  Patient offered    no additional history at this time.             FINDINGS:  The cardiomediastinal silhouette is within normal limits.  Mild    left basilar opacity may represent early infiltrate versus atelectasis.     Correlate clinically.  No aggressive osseous lesions are identified.                         =====   CONCLUSION:  Mild left basilar opacity may represent early infiltrate.     Correlate clinically.           LOCATION:  [facility]                       Dictated by (CST): Jacob Galeano MD on 1/08/2025 at 1:32 PM        Finalized by (CST): Jacob Galeano MD on 1/08/2025 at 1:34 PM         XR CHEST AP PORTABLE  (CPT=71045)   Final Result   PROCEDURE:  XR CHEST AP PORTABLE  (CPT=71045)       TECHNIQUE:  AP chest radiograph was obtained.       COMPARISON:  [facility] , XR, XR CHEST AP PORTABLE  (CPT=71045), 1/04/2025,    6:49 PM.       INDICATIONS:  Dobhoff placement       PATIENT STATED HISTORY: (As transcribed by Technologist)  Patient offered    no additional history at this time.             FINDINGS:   Dobbhoff feeding tube tip projects over the right lower lobe of    the lung.  No obvious pneumothorax is identified in the limited visualized    portions of the right hemithorax.  The catheter should be removed and    repositioned into the stomach.     Repeat chest radiograph is recommended after removal to assure no    pneumothorax development.                         =====   CONCLUSION:  The Dobbhoff feeding tube tip is in the right lower lobe of    the lung.  The patient's nurse, Haider, was notified of the findings and    recommendations at 1227 hours on 1/8/2025.           LOCATION:  [facility]                       Dictated by (CST): Jamal Gates MD on 1/08/2025 at 12:27 PM        Finalized by (CST): Jamal Gates MD on 1/08/2025 at 12:30 PM         XR CHEST AP PORTABLE  (CPT=71045)   Final Result   PROCEDURE:  XR CHEST AP PORTABLE  (CPT=71045)       TECHNIQUE:  AP chest radiograph was obtained.       COMPARISON:  None.       INDICATIONS:  n/v/f       PATIENT STATED HISTORY: (As transcribed by Technologist)  Patient arrived    via EMS from [facility] for n/v/d/fever.            FINDINGS:                           =====   CONCLUSION:  Normal cardiac and mediastinal contours.  No pulmonary edema    or focal airspace consolidation.  The pleural spaces are clear.           LOCATION:  [facility]                       Dictated by (CST): Alec Oliva MD on 1/04/2025 at 7:08 PM        Finalized by (CST): Alec Oliva MD on 1/04/2025 at 7:09 PM         CT BRAIN OR HEAD (CPT=70450)   Final Result   PROCEDURE:  CT BRAIN OR HEAD (54931)       COMPARISON:  None.       INDICATIONS:  AMS       TECHNIQUE:  Noncontrast CT scanning is performed through the brain. Dose    reduction techniques were used. Dose information is transmitted to the ACR    (American College of Radiology) NRDR (National Radiology Data Registry)    which includes the Dose Index    Registry.       PATIENT STATED HISTORY: (As transcribed by Technologist)  Pt with  altered    mental status nausea and vomiting.            FINDINGS:        VENTRICLES/SULCI: Diffuse sulcal and ventricular prominence concordant    with age.  No hydrocephalus.   INTRACRANIAL:  Negative for intracranial hemorrhage, mass effect, or acute    large vessel transcortical infarct.  Symmetric areas of hypoattenuation of    bilateral basal ganglia could represent prominent perivascular spaces    versus remote infarct.  Gray-white    differentiation is preserved.  Intracranial atherosclerosis.     SINUSES:           Mucoperiosteal thickening of bilateral maxillary    sinuses.  Trace right mastoid effusion.   SKULL:               No evidence for fracture or osseous abnormality.   OTHER:               No scalp hematoma.                         =====   CONCLUSION:         No acute intracranial process identified.               LOCATION:  [facility]           Dictated by (CST): Alec Oliva MD on 1/04/2025 at 6:55 PM        Finalized by (CST): Alec Oliva MD on 1/04/2025 at 6:58 PM         MRI BRAIN (CPT=70551)    (Results Pending)   XR ND/J LONG TUBE PLACEMENT BY MD (CPT=44500/84313)    (Results Pending)         ASSESSMENT & PLAN   Bg Watkins is a 64 year old male with PMH sig for ESRD on HD HTN, HLD, CVA, non-verbal and bed bound at baseline.  Presents with increased lethargy, vomiting and decreased PO intake.  CT head negative for acute intracranial process. Found to be positive for RSV. Nephrology is consulted for dialysis management.  Patient typically receives HD MWF at [facility]; primary nephrologist is Dr. Ridley. However patient has currently been at a facility for the last month and has been receiving dialysis 4 days per week.  Lat dialysis session was Friday 1/3.     ESRD on HD   - typically receives dialysis MWF, however has been receiving dialysis 4x weekly (MTThF) at his current facility.  Last dialysis on Friday 1/3 PTA  - no acute indication for RRT today   - sp HD today. UF 0-1L. HD directly  managed   - avoid morphine and deirdre. Renally dose meds     Hyponatremia   - mild. Will improve w HD     Anemia   - hgb 9.2, iron stores low w %sat 13 but ferritin 1681. Avoid IV iron w infection     Fever  - 1 blood culture growing GPC clusters, ID on consult     Thank you for allowing me to participate in the care of this patient. Please do not hesitate to call with questions or concerns.       Tori Urias MD  [facility] Nephrology

## 2025-01-09 NOTE — PROGRESS NOTES
[facility]   part of [facility] Infectious Disease Progress Note    Bg Watkins Patient Status:  Inpatient    10/27/1960 MRN [id number]   Location [facility] 4NW-A Attending Duran Redding MD   Hosp Day # 5 PCP April Simmons MD     Subjective:  Chart reviewed, no acute events.  Pt seen bedside with family present.  They report he is more alert than yesterday but not back to baseline.  No significant coughing.  He denies pain when asked.  Drowsy.  Seen by speech today and aspiration confirmed.     Objective:    Allergies:  Allergies[1]    Medications:    Current Facility-Administered Medications:     ampicillin-sulbactam (Unasyn) 1.5 g in sodium chloride 0.9% 100mL IVPB-MBP, 1.5 g, Intravenous, q12h    lacosamide (Vimpat) 200 mg/20mL injection 100 mg, 100 mg, Intravenous, Q12H    ipratropium-albuterol (Duoneb) 0.5-2.5 (3) MG/3ML inhalation solution 3 mL, 3 mL, Nebulization, QID    acetaminophen (Tylenol) rectal suppository 650 mg, 650 mg, Rectal, Q6H PRN    hydrALAzine (Apresoline) 20 mg/mL injection 10 mg, 10 mg, Intravenous, Q6H PRN    insulin aspart (NovoLOG) 100 Units/mL FlexPen 1-5 Units, 1-5 Units, Subcutaneous, TID AC and HS    aspirin DR tab 81 mg, 81 mg, Oral, Daily    atorvastatin (Lipitor) tab 80 mg, 80 mg, Oral, Nightly    calcitriol (Rocaltrol) cap 0.25 mcg, 0.25 mcg, Oral, Once per day on     clopidogrel (Plavix) tab 75 mg, 75 mg, Oral, Daily    acetaminophen (Tylenol Extra Strength) tab 500 mg, 500 mg, Oral, Q4H PRN    melatonin tab 3 mg, 3 mg, Oral, Nightly PRN    heparin (Porcine) 5000 UNIT/ML injection 5,000 Units, 5,000 Units, Subcutaneous, 2 times per day    Physical Exam:  General: drowsy,  Cooperative.  No apparent distress.  Vital Signs:  Blood pressure 104/50, pulse 73, temperature 99.4 °F (37.4 °C), temperature source Axillary, resp. rate 22, height 5' 7.72\" (1.72 m), weight 169 lb 12.1 oz (77 kg), SpO2 94%.   Temp (24hrs), Av.9  °F (36.6 °C), Min:96.9 °F (36.1 °C), Max:99.4 °F (37.4 °C)      HEENT: Exam is unremarkable.  Without scleral icterus.  Mucous membranes are moist. PERRLA.  Oropharynx is clear.   Lungs: non labored, poor inspiratory effort   Cardiac: Regular rate   Abdomen:  Soft, non-distended, non-tender, with no rebound or guarding.    Extremities:  No lower extremity edema noted.  Without clubbing or cyanosis.    Skin: Normal texture and turgor.  Neurologic: Cranial nerves are grossly intact.      Labs:  Lab Results   Component Value Date    WBC 7.2 01/09/2025    HGB 11.9 01/09/2025    HCT 36.7 01/09/2025    .0 01/09/2025    CREATSERUM 4.50 01/09/2025    BUN 41 01/09/2025     01/09/2025    K 4.4 01/09/2025    CL 96 01/09/2025    CO2 25.0 01/09/2025     01/09/2025    CA 9.7 01/09/2025    ALB 3.9 01/09/2025    ALKPHO 128 01/09/2025    BILT 0.6 01/09/2025    TP 7.2 01/09/2025    AST 16 01/09/2025    ALT <7 01/09/2025    MG 2.4 01/09/2025    PHOS 4.2 01/09/2025       Radiology:      Assessment/Plan:    1.  AMS  -admitted with AMS and fevers  -blood cultures on 1/6/25 with 1/2 growing staph hominis  -blood cultures from 1/4/25 negative and repeat BC from 1/8/25 NGTD  -RUE fistula clean  -possibly due to RSV vs aspiration (see below)     2. Fevers  -as above  -RSV positive on RVP  -CXR with possible LLL infiltrate  -on 2L NC,  Tmax 99.4 past 24hrs   -seen by speech and aspiration confirmed on swallow study  -on IV Unasyn    3. ESRD  -on HD  -RUE fistula    4. Hx of CVA  -bedbound    DISPO: Continue IV Unasyn, continue aspiration precautions.  Continue supportive care for RSV.  Will continue to follow.      If you have any questions or concerns please call [facility] at [phone].     LUCIANO Mayers  1/9/2025  12:31 PM         [1] No Known Allergies

## 2025-01-09 NOTE — PROGRESS NOTES
[facility]  FERNANDEZ Neurology Progress Note    Bg Watkins Patient Status:  Inpatient    10/27/1960 MRN [id number]   Location [facility] 4NW-A Attending Duran Redding MD   Hosp Day # 5 PCP April Simmons MD     CC: AMS    Subjective:  Bg Watkins is a 64 year old male with PMHx significant for diabetes, hypertension, hyperlipidemia, history of stroke, end-stage renal disease on hemodialysis, and  currently being treated for RSV infection, who was admitted on 2025. Neurology was consulted for altered mental status.  CTH was unrevealing of acute pathology. Ammonia and TSH were normal. EEG revealed epileptiform activity and moderate diffuse slowing. Pt was started on Vimpat 100 mg BID. MRI brain is still pending.     Seen for a follow up visit today. In bed, sitting up, awake, alert. No seizure like activity reported. Pt regards and follows simple commands. Family at bedside. Pt denies pain, no new focal weakness or numbness. No dizziness. Mongolian speaking mostly, family able to translate during assessment.         MEDICATIONS:  No current outpatient medications on file.     Current Facility-Administered Medications   Medication Dose Route Frequency    ampicillin-sulbactam (Unasyn) 1.5 g in sodium chloride 0.9% 100mL IVPB-MBP  1.5 g Intravenous q12h    lacosamide (Vimpat) 200 mg/20mL injection 100 mg  100 mg Intravenous Q12H    ipratropium-albuterol (Duoneb) 0.5-2.5 (3) MG/3ML inhalation solution 3 mL  3 mL Nebulization QID    acetaminophen (Tylenol) rectal suppository 650 mg  650 mg Rectal Q6H PRN    hydrALAzine (Apresoline) 20 mg/mL injection 10 mg  10 mg Intravenous Q6H PRN    insulin aspart (NovoLOG) 100 Units/mL FlexPen 1-5 Units  1-5 Units Subcutaneous TID AC and HS    aspirin DR tab 81 mg  81 mg Oral Daily    atorvastatin (Lipitor) tab 80 mg  80 mg Oral Nightly    calcitriol (Rocaltrol) cap 0.25 mcg  0.25 mcg Oral Once per day on     clopidogrel (Plavix) tab 75  mg  75 mg Oral Daily    acetaminophen (Tylenol Extra Strength) tab 500 mg  500 mg Oral Q4H PRN    melatonin tab 3 mg  3 mg Oral Nightly PRN    heparin (Porcine) 5000 UNIT/ML injection 5,000 Units  5,000 Units Subcutaneous 2 times per day       REVIEW OF SYSTEMS:  A 10-point system was reviewed.  Pertinent positives and negatives are noted in HPI.      PHYSICAL EXAMINATION:  VITAL SIGNS: /50 (BP Location: Left arm)   Pulse 71   Temp 98.3 °F (36.8 °C) (Axillary)   Resp 16   Ht 67.72\"   Wt 169 lb 12.1 oz (77 kg)   SpO2 99%   BMI 26.03 kg/m²   GENERAL:  Patient is a 64 year old male in no acute distress.  HEENT:  Normocephalic, atraumatic  ABD: Soft, non tender  SKIN: Warm, dry, no rashes    NEUROLOGICAL:   Mental status: Awake, eyes are open, regards and follows some simple commands, some are via imitating.   Speech: Non verbal, not answering questions.   Memory and comprehension: MJ, non verbal  Cranial Nerves: PERRL 3mm brisk, EOMI, no nystagmus, facial sensation intact, face symmetric, tongue midline, shoulder shrug equal, remainder CN grossly intact.  Motor: SANON, withdraws to painful stimuli throughout.   Sensory: Grimaces to painful stimuli  Gait: Deferred      Imaging/Diagnostics:  XR CHEST AP PORTABLE  (CPT=71045)    Result Date: 1/8/2025  CONCLUSION:  Mild left basilar opacity may represent early infiltrate.  Correlate clinically.   LOCATION:  [facility]      Dictated by (CST): Jacob Galeano MD on 1/08/2025 at 1:32 PM     Finalized by (CST): Jacob Galeano MD on 1/08/2025 at 1:34 PM       XR CHEST AP PORTABLE  (CPT=71045)    Result Date: 1/8/2025  CONCLUSION:  The Dobbhoff feeding tube tip is in the right lower lobe of the lung.  The patient's nurse, Haider, was notified of the findings and recommendations at 1227 hours on 1/8/2025.   LOCATION:  [facility]      Dictated by (CST): Jamal Gates MD on 1/08/2025 at 12:27 PM     Finalized by (CST): Jamal Gates MD on 1/08/2025 at 12:30 PM       XR  CHEST AP PORTABLE  (CPT=71045)    Result Date: 1/4/2025  CONCLUSION:  Normal cardiac and mediastinal contours.  No pulmonary edema or focal airspace consolidation.  The pleural spaces are clear.   LOCATION:  [facility]      Dictated by (CST): Alec Oliva MD on 1/04/2025 at 7:08 PM     Finalized by (CST): Alec Oliva MD on 1/04/2025 at 7:09 PM       CT BRAIN OR HEAD (CPT=70450)    Result Date: 1/4/2025  CONCLUSION:   No acute intracranial process identified.    LOCATION:  [facility]   Dictated by (CST): Alec Oliva MD on 1/04/2025 at 6:55 PM     Finalized by (CST): Alec Oliva MD on 1/04/2025 at 6:58 PM          Labs:  Recent Labs   Lab 01/04/25  1730 01/05/25  0651 01/07/25  0703 01/08/25  0631 01/09/25  0531   RBC 2.58*   < > 2.74* 2.80* 3.78*   HGB 8.3*   < > 8.6* 8.7* 11.9*   HCT 23.7*   < > 26.1* 26.9* 36.7*   MCV 91.9   < > 95.3 96.1 97.1   MCH 32.2   < > 31.4 31.1 31.5   MCHC 35.0   < > 33.0 32.3 32.4   RDW 15.3   < > 16.1 15.9 16.0   NEPRELIM 5.35  --   --  7.26 5.37   WBC 7.0   < > 10.8 8.6 7.2   .0   < > 174.0 183.0 185.0    < > = values in this interval not displayed.         Recent Labs   Lab 01/07/25  0703 01/08/25  0631 01/09/25  0531   * 194* 137*   BUN 44* 73* 41*   CREATSERUM 5.85* 8.06* 4.50*   EGFRCR 10* 7* 14*   CA 9.8 9.7 9.7    141 139   K 4.2 4.6 4.4    103 96*   CO2 27.0 24.0 25.0     EEG 1/8/2025    Impression:  Abnormal EEG.  Generalized sharp wave activity, as mentioned above were noted.  These waveforms are epileptogenic in nature.     Moderate diffuse slowing into delta and theta range was noted.  This constellation of findings can be seen in encephalopathy due to metabolic/toxic etiology, medication effects or diffuse cerebral injury.  Clinical correlation is recommended.     Assessment and Plan:    A 64 year old male with:     Encephalopathy, likely multifactorial, secondary to metabolic/toxic etiology, complicated by underlying medical problems including uremia and  RSV.   Seizures were in the differential as well. Investigations:  -  EEG - revealed generalized sharp wave activity, epileptogenic in nature.   -  Started on Vimpat 100 mg IV twice daily. Continue.   -  No driving, no swimming or taking tub baths alone, seizure precautions.   -  Ammonia level and TSH were normal  Previous history of stroke.  Continue aspirin and statin for stroke prophylaxis.  OT/PT.  Dysphagia. Patient has swallowing difficulties. Consider NG/DH tube/PEG tube placement.  Recent RSV infection with respiratory insufficiency.  Management as per hospitalist.  End-stage renal disease on hemodialysis. Nephrology following. Palliative care/hospice following.   Case discussed with patient's family, all questions answered. Verbalized understanding. Discussed with dr. Garzon.  To follow with further recommendations if indicated.   Is this a shared or split note between Advanced Practice Provider and Physician? Yes       Alondra CAMEJO  [facility]  1/9/2025, 9:13 AM   [employer] # [id number]

## 2025-01-09 NOTE — OCCUPATIONAL THERAPY NOTE
Order for OT eval received. Per staff, pt beginning to follow commands. Attempted this p.m., but is currently going down for testing. Will re-attempt as schedule and pt's medical stability allow

## 2025-01-09 NOTE — PROGRESS NOTES
[facility]   part of [facility]  Palliative Care Follow Up    Bg Watkins Patient Status:  Inpatient    10/27/1960 MRN [id number]   Location [facility] 4NW-A Attending Duran Redding MD   Hosp Day # 5 PCP April Simmons MD   [id number]    Date of visit:  2025  Day 5 of hospitalization.        Summary:         Bg Watkins is a 64 year old male with PMH significant for ESRD on HD, HTN, CVA - bedbound and nonverbal in addition to the other conditions noted below, presented to ED on 2025 with CC of AMS, reduced PO intake, and found to be RSV +. Ct neg for acute process. Patient is undergoing evaluation and treatment for RSV infection, acute hypoxia w mucous plugging, leukocytosis, encephalopathy, ESRD. Code status was explored w sister / POA on  and limits were set for DNAR, DNI. He is receiving HD at time of visit, but per HD tech, hypotensive so limited fluid removal and had received albumin x1.     Palliative care is following for support with goals of care.    Interval Events: Note neurology input, vimpat started for epileptogenic seizure activity on eeg  - Alertness and interaction improving  - SLP recs pureed solids, nectar / mildly thick liquids w 1:1 feed  - DHT placed, TF initiation pending  - Family met w hospice for info , [facility] treatment focused for now    SUBJECTIVE  Review of Systems - Palliative Care Symptom Assessment     I visited Bg with his family seated at bedside.  He opens eyes briefly. Per family he is more interactive today and doing \"thumbs up\" in response to their questions. They have not seen non-verbal signals that he is having discomfort.    OBJECTIVE     Hematology:   Lab Results   Component Value Date    WBC 7.2 2025    HGB 11.9 (L) 2025    HCT 36.7 (L) 2025    .0 2025       Chemistry:  Lab Results   Component Value Date    CREATSERUM 4.50 (H) 2025    BUN 41 (H) 2025     2025    K 4.4  01/09/2025    CL 96 (L) 01/09/2025    CO2 25.0 01/09/2025     (H) 01/09/2025    CA 9.7 01/09/2025    ALB 3.9 01/09/2025    ALKPHO 128 (H) 01/09/2025    BILT 0.6 01/09/2025    TP 7.2 01/09/2025    AST 16 01/09/2025    ALT <7 (L) 01/09/2025    PSA 1.01 05/17/2021    MG 2.4 01/09/2025    PHOS 4.2 01/09/2025       Imaging:  XR CHEST AP PORTABLE  (CPT=71045)    Result Date: 1/8/2025  CONCLUSION:  Mild left basilar opacity may represent early infiltrate.  Correlate clinically.   LOCATION:  [facility]      Dictated by (CST): Jacob Galeano MD on 1/08/2025 at 1:32 PM     Finalized by (CST): Jacob Galeano MD on 1/08/2025 at 1:34 PM       XR CHEST AP PORTABLE  (CPT=71045)    Result Date: 1/8/2025  CONCLUSION:  The Dobbhoff feeding tube tip is in the right lower lobe of the lung.  The patient's nurse, Haider, was notified of the findings and recommendations at 1227 hours on 1/8/2025.   LOCATION:  [facility]      Dictated by (CST): Jamal Gates MD on 1/08/2025 at 12:27 PM     Finalized by (CST): Jamal Gates MD on 1/08/2025 at 12:30 PM        Vital Signs:  Blood pressure 109/50, pulse 71, temperature 98.3 °F (36.8 °C), temperature source Axillary, resp. rate 16, height 5' 7.72\" (1.72 m), weight 169 lb 12.1 oz (77 kg), SpO2 99%.  Body mass index is 26.03 kg/m².    Physical Exam:     GENERAL: Lethargic, but more responsive today. NAD.  HEENT: continues to have audible upper airway congestion  CARDIAC: HR 70s per tele, hypertensive  RESPIRATORY: no increased effort at rest, on NC, very poor cough effort  NEUROLOGIC: is responding to voice/stim.   SKIN: Warm and dry.     Palliative Performance Scale: 25%   Palliative Performance Scale   % Ambulation Activity Level Self-Care Intake Consciousness   100 Full Normal Full   Normal Full   90 Full No disease  Normal Full Normal Full   80 Full Some disease  Normal w/effort Full Normal or  Reduced Full   70 Reduced Some disease  Can't perform job Full Normal or   Reduced Full   60  Reduced Significant disease  Can't perform hobby Occasional  Assist Normal or   Reduced Full or confused   50 Mainly sit/lie Extensive Disease  Can't do any work Partial Assist Normal or Reduced Full or confused   40 Mainly in bed Extensive Disease  Can't do any work Mainly Assist Normal or Reduced Full or confused   30 Bedbound Extensive Disease  Can't do any work Max Assist  Total Care Reduced Drowsy/confused   20 Bedbound Extensive Disease  Can't do any work Max Assist  Total Care Minimal Drowsy/confused   10 Bedbound/coma Extensive Disease  Can't do any work  coma  Max Assist  Total Care Mouth care Drowsy or coma   0 Death       Palliative Care Assessment:     Goals of Care Discussion:     Met with Bg's family in family waiting room. Provided medical update.   They are now informed of c/f seizures per EEG and start of anti seizure medication. They have further questions surrounding seizures and management, will defer to neuro (epic message sent). They stated plan for MRI brain, and agree w imaging to help support their decisions.    They are aware of SLP clearing for modified consistency, and we also discussed his mentation and strength limiting ability to take in enough, and consistently, etc. Reviewed his poor cough effort and back of throat congestion as well.     They are aware of plan to start TF per DHT, and planning to give him over the weekend to see how he responds to nutrition and treating reversible problems. Family confirms that PEG would not be in line w his wishes / GOC.   - They met w hospice 1/8, and appreciate having the information for when the time is right.    Emotional support provided.    Will remain available peripherally, and see Bg and his family as needed.      Problem List:       Principal Problem:    Encephalopathy due to other virus  Active Problems:    Encephalopathy    Weakness generalized    FTT (failure to thrive) in adult    Palliative care encounter    Counseling regarding  advance care planning and goals of care  ESRD on HD    Palliative Care Recommendations/Plan:         Goals of Care:  Family is taking more time to assess  Bg's response to treatment while considering his QOL and overall direction of his care. They are aware of seizure activity and new meds. Message to neuro MD to inform of family's request for further clarification.  Family plans to give Bg through the weekend to see how he responds to further treatment, and w TF per DHT. Also await further information from pending MRI brain.  PEG is not in line w his wishes.  Family met w hospice 1/8 for information.    Code Status: DNAR/Selective Treatment.   Discussed to confirm w sister Maria Del Rosario and other siblings on 1/6.   - PEG not in line w GOC.  POLST - deferred as GOC are being defined.     HCPOA: Healthcare Agent's Name: SisterMaria Del Rosario    Disposition:  Pending clinical course.      A total of 25 minutes were spent on this visit, which included all of the following:  Chart review, direct face to face contact, history taking, physical examination, counseling and coordinating care, and documentation.        Reviewed the above with patient's RN, neuro.    Thank you for inviting Palliative Care to participate in the care of Bg Watkins and family.       Will remain available peripherally and support as needed.      BASHIR Thakkar  Palliative Care    1/9/2025  4:11 PM    The 21st Century Cures Act makes medical notes like these available to patients in the interest of transparency. Please be advised this is a medical document. Medical documents are intended to carry relevant information, facts as evident, and the clinical opinion of the practitioner. The medical note is intended as a peer to peer communication, and may appear blunt or direct. It is written in medical language and may contain abbreviations or verbiage that are unfamiliar.

## 2025-01-09 NOTE — PROGRESS NOTES
[facility] Hospitalist Progress Note                                                                     [facility]   part of [facility]      Bg Watkins  10/27/1960    SUBJECTIVE: PT not responding today still but does open eyes to stimuli     OBJECTIVE:  Temp:  [96.9 °F (36.1 °C)-100.7 °F (38.2 °C)] 96.9 °F (36.1 °C)  Pulse:  [63-91] 63  Resp:  [16-28] 18  BP: ()/(51-64) 95/51  SpO2:  [79 %-100 %] 100 %  Exam  Gen: No acute distress, lethargic  but appears to awaken more  Pulm: Lungs clear bilaterally, normal respiratory effort, no crackles, no wheezing  CV: Heart with regular rate and rhythm, no murmur.    Abd: Abdomen soft, nontender, nondistended, bowel sounds present  MSK: no significant pitting edema or tenderness of the LE  Skin: no new rashes or lesions    Labs:   Recent Labs   Lab 01/04/25 1730 01/05/25 0651 01/06/25  0638 01/07/25  0703 01/08/25  0631   WBC 7.0 13.4* 11.0 10.8 8.6   HGB 8.3* 8.6* 9.2* 8.6* 8.7*   MCV 91.9 93.0 92.5 95.3 96.1   .0 233.0 214.0 174.0 183.0       Recent Labs   Lab 01/04/25 1731 01/05/25 0651 01/06/25  0638 01/07/25  0703 01/08/25  0631   * 133* 137 140 141   K 4.6 4.4 5.0 4.2 4.6   CL 92* 94* 97* 101 103   CO2 31.0 30.0 27.0 27.0 24.0   BUN 31* 37* 59* 44* 73*   CREATSERUM 5.36* 6.17* 8.10* 5.85* 8.06*   CA 9.7 9.8 9.7 9.8 9.7   MG 2.2  --   --  2.3 2.5   PHOS 3.2  --  2.8 3.6 4.6   * 127* 150* 165* 194*       Recent Labs   Lab 01/04/25 1731 01/05/25 0651 01/06/25  0638 01/07/25  0703 01/08/25  0631   ALT <7* <7* <7* <7* <7*   AST 17 13 14 16 12   ALB 3.7 3.7 3.8 4.0 4.0       Recent Labs   Lab 01/07/25  1543 01/07/25  2115 01/08/25  0512 01/08/25  1305 01/08/25  1733   PGLU 162* 168* 205* 206* 116*       Meds:   Scheduled:    ampicillin-sulbactam  1.5 g Intravenous q12h    lacosamide  100 mg Intravenous Q12H    insulin aspart  1-5 Units Subcutaneous TID AC and HS    aspirin  81 mg Oral Daily     atorvastatin  80 mg Oral Nightly    calcitriol  0.25 mcg Oral Once per day on Monday Wednesday Friday    clopidogrel  75 mg Oral Daily    heparin  5,000 Units Subcutaneous 2 times per day     Continuous Infusions:   PRN:   acetaminophen    hydrALAzine    acetaminophen    melatonin    Assessment/ Plan:      #RSV  #Acute hypoxia with mucus plugging  #Leukocytosis   #Acute metabolic encephalopathy  -CXR without infiltrates.  CT head negative.  -Supportive therapy,  nebs, bronchial hygiene.  -Will maintain patient NPO for now due to mentation.--> IR to place dobhoff as pt still not able to take PO  -Neuro following --> EEG     #Hypertension  #Hyperlipidemia  #ESRD on HD  #Type 2 diabetes  -Holding home PO until mentation improves.  Discussed with bedside RN.  -Sliding scale coverage as patient is with minimal oral intake.  -Nephrology on consult for HD management    #Fever  #Bacteremia   -likely due to ciral infection  -check blood cx--> pos --> ID consult, abx per ID--> could be contamination, repeat cx pending, IV unasyn added due to hypoxia and possible infiltrate on cxr        DVT ppx: Heparin subcu  Diet: NPO   Code status: DNAR/ DNI  Disposition: No discharge    Phillip Sherwood MD  [facility] Hospitalist  [phone]

## 2025-01-09 NOTE — PROGRESS NOTES
[facility] Hospitalist Progress Note                                                                     [facility]   part of [facility]      Bg Watkins  10/27/1960    SUBJECTIVE: PT more awake and able to give me hand signals.     OBJECTIVE:  Temp:  [96.9 °F (36.1 °C)-99 °F (37.2 °C)] 98.3 °F (36.8 °C)  Pulse:  [63-86] 71  Resp:  [16-28] 16  BP: ()/(44-55) 109/50  SpO2:  [82 %-100 %] 99 %  Exam  Gen: No acute distress, lethargic  but appears to awaken more  Pulm: Lungs clear bilaterally, normal respiratory effort, no crackles, no wheezing  CV: Heart with regular rate and rhythm, no murmur.    Abd: Abdomen soft, nontender, nondistended, bowel sounds present  MSK: no significant pitting edema or tenderness of the LE  Skin: no new rashes or lesions    Labs:   Recent Labs   Lab 01/05/25  0651 01/06/25  0638 01/07/25  0703 01/08/25  0631 01/09/25  0531   WBC 13.4* 11.0 10.8 8.6 7.2   HGB 8.6* 9.2* 8.6* 8.7* 11.9*   MCV 93.0 92.5 95.3 96.1 97.1   .0 214.0 174.0 183.0 185.0       Recent Labs   Lab 01/04/25  1731 01/05/25  0651 01/06/25  0638 01/07/25  0703 01/08/25  0631 01/09/25  0531   * 133* 137 140 141 139   K 4.6 4.4 5.0 4.2 4.6 4.4   CL 92* 94* 97* 101 103 96*   CO2 31.0 30.0 27.0 27.0 24.0 25.0   BUN 31* 37* 59* 44* 73* 41*   CREATSERUM 5.36* 6.17* 8.10* 5.85* 8.06* 4.50*   CA 9.7 9.8 9.7 9.8 9.7 9.7   MG 2.2  --   --  2.3 2.5 2.4   PHOS 3.2  --  2.8 3.6 4.6 4.2   * 127* 150* 165* 194* 137*       Recent Labs   Lab 01/05/25  0651 01/06/25  0638 01/07/25  0703 01/08/25  0631 01/09/25  0531   ALT <7* <7* <7* <7* <7*   AST 13 14 16 12 16   ALB 3.7 3.8 4.0 4.0 3.9       Recent Labs   Lab 01/08/25  0512 01/08/25  1305 01/08/25  1733 01/08/25  2104 01/09/25  0531   PGLU 205* 206* 116* 141* 156*       Meds:   Scheduled:    ampicillin-sulbactam  1.5 g Intravenous q12h    lacosamide  100 mg Intravenous Q12H    ipratropium-albuterol  3 mL  Nebulization QID    insulin aspart  1-5 Units Subcutaneous TID AC and HS    aspirin  81 mg Oral Daily    atorvastatin  80 mg Oral Nightly    calcitriol  0.25 mcg Oral Once per day on Monday Wednesday Friday    clopidogrel  75 mg Oral Daily    heparin  5,000 Units Subcutaneous 2 times per day     Continuous Infusions:   PRN:   acetaminophen    hydrALAzine    acetaminophen    melatonin    Assessment/ Plan:      #RSV  #Acute hypoxia with mucus plugging  #Leukocytosis   #Acute metabolic encephalopathy  -CXR without infiltrates.  CT head negative.  -Supportive therapy,  nebs, bronchial hygiene.  -Will maintain patient NPO for now due to mentation.--> IR to place dobhoff as pt still not able to take PO  -Neuro following --> EEG     #Hypertension  #Hyperlipidemia  #ESRD on HD  #Type 2 diabetes  -Holding home PO until mentation improves.  Discussed with bedside RN.--> resume once dobhoff placed  -Sliding scale coverage as patient is with minimal oral intake.  -Nephrology on consult for HD management    #Fever  #Bacteremia   -likely due to ciral infection  -check blood cx--> pos --> ID consult, abx per ID--> could be contamination, repeat cx pending, IV unasyn added due to hypoxia and possible infiltrate on cxr        DVT ppx: Heparin subcu  Diet: NPO   Code status: DNAR/ DNI  Disposition: No discharge    MD Fátima Davies Hospitalist  [phone]

## 2025-01-09 NOTE — HOSPICE RN NOTE
[facility] visit for follow up on hospice informational meeting yesterday. Spoke with daughter Maria Del Rosario. Maria Del Rosario says there has been improvement in her dad's condition. He is awake today. Addressed hospice questions and concerns.  Discussed with Maria Del Rosario that patient appears appropriate for routine level of hospice if family is wanting to stop aggressive treatment. Per Maria Del Rosario, family has not made any decisions yet regarding hospice. [facility] will continue to follow this patient peripherally. Please call [facility] if family makes a decision for hospice moving forward.     Madelyn Medellin RN, PN  [facility] Liaison  [phone] after hours

## 2025-01-09 NOTE — PLAN OF CARE
Pt more awake when compared to previous nights. Eyes tracking. Moves hands. Nonverbal. O2 needs continued fluctuating, up to nonrebreather use. See VS flowsheets. MD updated, nebs ordered. Will try to wean. Suction prn. Other VSS. Meds per MAR. MRI brain pending. Plan for Dobbhoff w/ XR for feeds.     Fall risk protocol followed, call light within reach.     BP 88/45 this AM. MD notified. Weaned to 2L NC.

## 2025-01-10 ENCOUNTER — HOSPITAL ENCOUNTER (INPATIENT)
Facility: HOSPITAL | Age: 65
End: 2025-01-10
Attending: STUDENT IN AN ORGANIZED HEALTH CARE EDUCATION/TRAINING PROGRAM | Admitting: STUDENT IN AN ORGANIZED HEALTH CARE EDUCATION/TRAINING PROGRAM
Payer: OTHER MISCELLANEOUS

## 2025-01-10 ENCOUNTER — HOSPITAL ENCOUNTER (INPATIENT)
Facility: HOSPITAL | Age: 65
LOS: 4 days | Discharge: HOSPICE | End: 2025-01-14
Attending: STUDENT IN AN ORGANIZED HEALTH CARE EDUCATION/TRAINING PROGRAM | Admitting: STUDENT IN AN ORGANIZED HEALTH CARE EDUCATION/TRAINING PROGRAM
Payer: OTHER MISCELLANEOUS

## 2025-01-10 VITALS
WEIGHT: 169.75 LBS | OXYGEN SATURATION: 94 % | HEIGHT: 67.72 IN | SYSTOLIC BLOOD PRESSURE: 102 MMHG | HEART RATE: 68 BPM | RESPIRATION RATE: 16 BRPM | BODY MASS INDEX: 26.03 KG/M2 | TEMPERATURE: 97 F | DIASTOLIC BLOOD PRESSURE: 46 MMHG

## 2025-01-10 LAB
ALBUMIN SERPL-MCNC: 3.7 G/DL (ref 3.2–4.8)
ALBUMIN/GLOB SERPL: 1.3 {RATIO} (ref 1–2)
ALP LIVER SERPL-CCNC: 113 U/L
ALT SERPL-CCNC: <7 U/L
ANION GAP SERPL CALC-SCNC: 14 MMOL/L (ref 0–18)
AST SERPL-CCNC: 21 U/L (ref ?–34)
BASOPHILS # BLD AUTO: 0.02 X10(3) UL (ref 0–0.2)
BASOPHILS NFR BLD AUTO: 0.2 %
BILIRUB SERPL-MCNC: 0.4 MG/DL (ref 0.2–1.1)
BUN BLD-MCNC: 74 MG/DL (ref 9–23)
CALCIUM BLD-MCNC: 9.3 MG/DL (ref 8.7–10.4)
CHLORIDE SERPL-SCNC: 99 MMOL/L (ref 98–112)
CO2 SERPL-SCNC: 25 MMOL/L (ref 21–32)
CREAT BLD-MCNC: 6.47 MG/DL
EGFRCR SERPLBLD CKD-EPI 2021: 9 ML/MIN/1.73M2 (ref 60–?)
EOSINOPHIL # BLD AUTO: 0.16 X10(3) UL (ref 0–0.7)
EOSINOPHIL NFR BLD AUTO: 1.6 %
ERYTHROCYTE [DISTWIDTH] IN BLOOD BY AUTOMATED COUNT: 15.8 %
GLOBULIN PLAS-MCNC: 2.9 G/DL (ref 2–3.5)
GLUCOSE BLD-MCNC: 253 MG/DL (ref 70–99)
GLUCOSE BLD-MCNC: 258 MG/DL (ref 70–99)
HCT VFR BLD AUTO: 25.5 %
HGB BLD-MCNC: 8.6 G/DL
IMM GRANULOCYTES # BLD AUTO: 0.07 X10(3) UL (ref 0–1)
IMM GRANULOCYTES NFR BLD: 0.7 %
LYMPHOCYTES # BLD AUTO: 1.21 X10(3) UL (ref 1–4)
LYMPHOCYTES NFR BLD AUTO: 12.3 %
MAGNESIUM SERPL-MCNC: 2.4 MG/DL (ref 1.6–2.6)
MCH RBC QN AUTO: 31.7 PG (ref 26–34)
MCHC RBC AUTO-ENTMCNC: 33.7 G/DL (ref 31–37)
MCV RBC AUTO: 94.1 FL
MONOCYTES # BLD AUTO: 0.96 X10(3) UL (ref 0.1–1)
MONOCYTES NFR BLD AUTO: 9.8 %
NEUTROPHILS # BLD AUTO: 7.42 X10 (3) UL (ref 1.5–7.7)
NEUTROPHILS # BLD AUTO: 7.42 X10(3) UL (ref 1.5–7.7)
NEUTROPHILS NFR BLD AUTO: 75.4 %
OSMOLALITY SERPL CALC.SUM OF ELEC: 317 MOSM/KG (ref 275–295)
PHOSPHATE SERPL-MCNC: 3.6 MG/DL (ref 2.4–5.1)
PLATELET # BLD AUTO: 178 10(3)UL (ref 150–450)
PLATELETS.RETICULATED NFR BLD AUTO: 1.1 % (ref 0–7)
POTASSIUM SERPL-SCNC: 4.5 MMOL/L (ref 3.5–5.1)
PROT SERPL-MCNC: 6.6 G/DL (ref 5.7–8.2)
RBC # BLD AUTO: 2.71 X10(6)UL
SODIUM SERPL-SCNC: 138 MMOL/L (ref 136–145)
WBC # BLD AUTO: 9.8 X10(3) UL (ref 4–11)

## 2025-01-10 PROCEDURE — 99231 SBSQ HOSP IP/OBS SF/LOW 25: CPT | Performed by: NURSE PRACTITIONER

## 2025-01-10 RX ORDER — GLYCOPYRROLATE 0.2 MG/ML
0.4 INJECTION, SOLUTION INTRAMUSCULAR; INTRAVENOUS
Status: DISCONTINUED | OUTPATIENT
Start: 2025-01-10 | End: 2025-01-11

## 2025-01-10 RX ORDER — ATROPINE SULFATE 10 MG/ML
2 SOLUTION/ DROPS OPHTHALMIC EVERY 2 HOUR PRN
Status: DISCONTINUED | OUTPATIENT
Start: 2025-01-10 | End: 2025-01-14

## 2025-01-10 RX ORDER — LORAZEPAM 2 MG/ML
0.5 INJECTION INTRAMUSCULAR
Status: DISCONTINUED | OUTPATIENT
Start: 2025-01-10 | End: 2025-01-14

## 2025-01-10 RX ORDER — HYDROMORPHONE HYDROCHLORIDE 1 MG/ML
0.2 INJECTION, SOLUTION INTRAMUSCULAR; INTRAVENOUS; SUBCUTANEOUS
Status: DISCONTINUED | OUTPATIENT
Start: 2025-01-10 | End: 2025-01-14

## 2025-01-10 RX ORDER — LORAZEPAM 2 MG/ML
1 INJECTION INTRAMUSCULAR
Status: DISCONTINUED | OUTPATIENT
Start: 2025-01-10 | End: 2025-01-14

## 2025-01-10 RX ORDER — ONDANSETRON 2 MG/ML
4 INJECTION INTRAMUSCULAR; INTRAVENOUS EVERY 6 HOURS PRN
Status: DISCONTINUED | OUTPATIENT
Start: 2025-01-10 | End: 2025-01-14

## 2025-01-10 RX ORDER — ACETAMINOPHEN 650 MG/1
650 SUPPOSITORY RECTAL EVERY 6 HOURS PRN
Status: DISCONTINUED | OUTPATIENT
Start: 2025-01-10 | End: 2025-01-14

## 2025-01-10 RX ORDER — FUROSEMIDE 10 MG/ML
40 INJECTION INTRAMUSCULAR; INTRAVENOUS EVERY 8 HOURS PRN
Status: DISCONTINUED | OUTPATIENT
Start: 2025-01-10 | End: 2025-01-14

## 2025-01-10 RX ORDER — HYDROMORPHONE HYDROCHLORIDE 1 MG/ML
0.4 INJECTION, SOLUTION INTRAMUSCULAR; INTRAVENOUS; SUBCUTANEOUS
Status: DISCONTINUED | OUTPATIENT
Start: 2025-01-10 | End: 2025-01-14

## 2025-01-10 RX ORDER — SCOPOLAMINE 1 MG/3D
1 PATCH, EXTENDED RELEASE TRANSDERMAL
Status: DISCONTINUED | OUTPATIENT
Start: 2025-01-10 | End: 2025-01-14

## 2025-01-10 RX ORDER — BISACODYL 10 MG
10 SUPPOSITORY, RECTAL RECTAL
Status: DISCONTINUED | OUTPATIENT
Start: 2025-01-10 | End: 2025-01-14

## 2025-01-10 RX ORDER — LORAZEPAM 2 MG/ML
2 INJECTION INTRAMUSCULAR
Status: DISCONTINUED | OUTPATIENT
Start: 2025-01-10 | End: 2025-01-14

## 2025-01-10 RX ORDER — HYDROMORPHONE HYDROCHLORIDE 1 MG/ML
0.8 INJECTION, SOLUTION INTRAMUSCULAR; INTRAVENOUS; SUBCUTANEOUS
Status: DISCONTINUED | OUTPATIENT
Start: 2025-01-10 | End: 2025-01-14

## 2025-01-10 RX ORDER — HALOPERIDOL 5 MG/ML
1 INJECTION INTRAMUSCULAR
Status: DISCONTINUED | OUTPATIENT
Start: 2025-01-10 | End: 2025-01-14

## 2025-01-10 RX ORDER — HALOPERIDOL 5 MG/ML
2 INJECTION INTRAMUSCULAR
Status: DISCONTINUED | OUTPATIENT
Start: 2025-01-10 | End: 2025-01-14

## 2025-01-10 RX ORDER — ACETAMINOPHEN 10 MG/ML
1000 INJECTION, SOLUTION INTRAVENOUS EVERY 6 HOURS PRN
Status: DISCONTINUED | OUTPATIENT
Start: 2025-01-10 | End: 2025-01-14

## 2025-01-10 NOTE — HOSPICE RN NOTE
[facility] received a call from the patient's sister/POA Maria Del Rosario Adames, that they are now interested in moving forward with hospice. She is present at bedside. Hospice will stop by to see the patient and family shortly.    Taty Cotton RN  [facility] RN Liaison  [phone] (After-hours)

## 2025-01-10 NOTE — PROGRESS NOTES
[facility] Hospitalist Progress Note                                                                     [facility]   part of [facility]      Bg Watkins  10/27/1960    SUBJECTIVE: PT more awake and able to give me hand signals.     OBJECTIVE:  Temp:  [97.4 °F (36.3 °C)-99 °F (37.2 °C)] 97.4 °F (36.3 °C)  Pulse:  [68-79] 68  Resp:  [16-24] 16  BP: ()/(45-47) 102/46  SpO2:  [93 %-96 %] 94 %  Exam  Gen: No acute distress, lethargic  but appears to awaken more  Pulm: Lungs clear bilaterally, normal respiratory effort, no crackles, no wheezing  CV: Heart with regular rate and rhythm, no murmur.    Abd: Abdomen soft, nontender, nondistended, bowel sounds present  MSK: no significant pitting edema or tenderness of the LE  Skin: no new rashes or lesions    Labs:   Recent Labs   Lab 01/06/25  0638 01/07/25  0703 01/08/25  0631 01/09/25  0531 01/10/25  0637   WBC 11.0 10.8 8.6 7.2 9.8   HGB 9.2* 8.6* 8.7* 11.9* 8.6*   MCV 92.5 95.3 96.1 97.1 94.1   .0 174.0 183.0 185.0 178.0       Recent Labs   Lab 01/04/25  1731 01/05/25  0651 01/06/25  0638 01/07/25  0703 01/08/25  0631 01/09/25  0531 01/10/25  0637   *   < > 137 140 141 139 138   K 4.6   < > 5.0 4.2 4.6 4.4 4.5   CL 92*   < > 97* 101 103 96* 99   CO2 31.0   < > 27.0 27.0 24.0 25.0 25.0   BUN 31*   < > 59* 44* 73* 41* 74*   CREATSERUM 5.36*   < > 8.10* 5.85* 8.06* 4.50* 6.47*   CA 9.7   < > 9.7 9.8 9.7 9.7 9.3   MG 2.2  --   --  2.3 2.5 2.4 2.4   PHOS 3.2  --  2.8 3.6 4.6 4.2 3.6   *   < > 150* 165* 194* 137* 258*    < > = values in this interval not displayed.       Recent Labs   Lab 01/06/25  0638 01/07/25  0703 01/08/25  0631 01/09/25  0531 01/10/25  0637   ALT <7* <7* <7* <7* <7*   AST 14 16 12 16 21   ALB 3.8 4.0 4.0 3.9 3.7       Recent Labs   Lab 01/09/25  1211 01/09/25  1236 01/09/25  1622 01/09/25  2115 01/10/25  0508   PGLU 192* 187* 183* 181* 253*       Meds:   Scheduled:    lacosamide   100 mg Per G Tube BID    ampicillin-sulbactam  1.5 g Intravenous q12h    ipratropium-albuterol  3 mL Nebulization QID    insulin aspart  1-5 Units Subcutaneous TID AC and HS    aspirin  81 mg Oral Daily    atorvastatin  80 mg Oral Nightly    calcitriol  0.25 mcg Oral Once per day on Monday Wednesday Friday    clopidogrel  75 mg Oral Daily    heparin  5,000 Units Subcutaneous 2 times per day     Continuous Infusions:   PRN:   sodium chloride **AND** albumin human    acetaminophen    hydrALAzine    acetaminophen    melatonin    Assessment/ Plan:      #RSV  #Acute hypoxia with mucus plugging  #Leukocytosis   #Acute metabolic encephalopathy  -CXR without infiltrates.  CT head negative.  -Supportive therapy,  nebs, bronchial hygiene.  -Will maintain patient NPO for now due to mentation.--> IR to place dobhoff as pt still not able to take PO--> dobhoff placed and pt started TF, may need to discuss peg tube if not able to take enough calories in if starts to take po  -Neuro following --> EEG--> abn signals --> vimpat started for seizure prophylaxis.  MRI Brain pending      #Hypertension  #Hyperlipidemia  #ESRD on HD  #Type 2 diabetes  -Holding home PO until mentation improves.  Discussed with bedside RN.--> resumed once dobhoff placed  -Sliding scale coverage as patient is with minimal oral intake.  -Nephrology on consult for HD management    #Fever  #Bacteremia   #PNA-aspiration   -likely due to ciral infection  -check blood cx--> pos --> ID consult, abx per ID--> could be contamination, repeat cx pending--> ngtd, IV unasyn added due to hypoxia and possible infiltrate on cxr        DVT ppx: Heparin subcu  Diet: NPO   Code status: DNAR/ DNI  Disposition: No discharge    Phillip Sherwood MD  [facility]ist  [phone]

## 2025-01-10 NOTE — DIETARY NOTE
[facility]   part of [facility]    NUTRITION ASSESSMENT    Unable to diagnose malnutrition criteria at this time.    NUTRITION INTERVENTION:    Enteral Nutrition - Via NG Recommend  Nepro 1.8  goal rate of 50 mL/hr.   This will provide 2160 kcal, 97 grams protein, 876 mL total free water, and 100% of RDI's.   Recommend 170 mL water flush q 4 hours, TF+FWF provides 1896 mL total fluids. Or flush per Neph MD  Coordination of Nutrition Care - SLP consult prior to diet advancement.    PATIENT STATUS:   1/10/25-  pt currently at goal for TF of 50ml/hrs and tolerating.  Family meeting with hospice today. RD remains available.    01/08/25 Pt admitted with encephalopathy. RD screened due to consult for DHT / tube feeds. No family present, pt with respiratory. RD consulted for enteral nutrition recs. Recs as per above. Noted hospice on consult. Follow for intakes, tolerances, wts, GOC     PMH:  ESRD on HD, hypertension, hyperlipidemia, type 2 diabetes, and CVA     ANTHROPOMETRICS:  Ht: 172 cm (5' 7.72\")  Wt: 77 kg (169 lb 12.1 oz).   BMI: Body mass index is 26.03 kg/m².  IBW: 67 kg    WEIGHT HISTORY:   Wt Readings from Last 10 Encounters:   01/04/25 77 kg (169 lb 12.1 oz)   03/24/22 77 kg (169 lb 12.8 oz)   12/09/21 76.1 kg (167 lb 12.8 oz)   10/08/21 74.8 kg (165 lb)   09/30/21 73.9 kg (163 lb)   07/01/21 75.3 kg (166 lb)   05/20/21 70.8 kg (156 lb)   05/17/21 70.3 kg (155 lb)   04/08/21 77.1 kg (170 lb)   03/09/21 73 kg (161 lb)      NUTRITION:  Diet:       Procedures    NPO      Food Allergies: No  Cultural/Ethnic/Voodoo Preferences Addressed: Yes    Percent Meals Eaten (last 3 days)       Date/Time Percent Meals Eaten (%)    01/09/25 0818 0 %    01/09/25 1732 0 %          GI system review: WNL Last BM Date: 01/07/25  Skin and wounds: WNL    NUTRITION RELATED PHYSICAL FINDINGS:     1. Body Fat/Muscle Mass: unable to assess pt with other services      2. Fluid Accumulation: none    NUTRITION PRESCRIPTION:  77  kg Actual Body Weight  Calories: 8417-1859 calories/day (25-30 kcal/kg)  Protein:  grams protein/day (1.2-1.5 grams protein per kg)  Fluid: ~1 ml/kcal or per MD discretion    NUTRITION DIAGNOSIS/PROBLEM:  Inadequate oral intake related to physiological causes as evidenced by documented/reported insufficient oral intake , need for enteral nutrition    MONITOR AND EVALUATE/NUTRITION GOALS:  Weight stable within 1 to 2 lbs during admission - Ongoing  Start alternative nutrition in 24-48 hrs if diet is not able to advance- Met, continues  Tolerate alternative nutrition at 100% of goal - Met, continues    MEDICATIONS:  insulin    LABS:  Reviewed    Pt is at Moderate nutrition risk    Polly Cadet RD, LDN  Clinical Dietitian  [phone]

## 2025-01-10 NOTE — PROGRESS NOTES
[facility]   part of [facility]    Nephrology Progress Note    Bg Watkins Attending:  Duran Redding MD     Cc: esrd    SUBJECTIVE      More awake today. No complaints noted    PHYSICAL EXAM   Vital signs: /47 (BP Location: Left arm)   Pulse 71   Temp 99 °F (37.2 °C) (Axillary)   Resp 24   Ht 5' 7.72\" (1.72 m)   Wt 169 lb 12.1 oz (77 kg)   SpO2 95%   BMI 26.03 kg/m²   Temp (24hrs), Av.3 °F (36.8 °C), Min:97 °F (36.1 °C), Max:99.4 °F (37.4 °C)       Intake/Output Summary (Last 24 hours) at 2025 1905  Last data filed at 2025 1855  Gross per 24 hour   Intake 30 ml   Output --   Net 30 ml     Wt Readings from Last 3 Encounters:   25 169 lb 12.1 oz (77 kg)   22 169 lb 12.8 oz (77 kg)   21 167 lb 12.8 oz (76.1 kg)     General: NAD  HEENT: NCAT, MMM  Neck: Supple   Cardiac: Regular rate and rhythm   Lungs: Coarse  Abdomen: Soft, non-tender, nondistended   Extremities: No edema  Neurologic: No asterxis  Skin: Warm and dry, no rashes     MEDS     Current Facility-Administered Medications   Medication Dose Route Frequency    ampicillin-sulbactam (Unasyn) 1.5 g in sodium chloride 0.9% 100mL IVPB-MBP  1.5 g Intravenous q12h    lacosamide (Vimpat) 200 mg/20mL injection 100 mg  100 mg Intravenous Q12H    ipratropium-albuterol (Duoneb) 0.5-2.5 (3) MG/3ML inhalation solution 3 mL  3 mL Nebulization QID    acetaminophen (Tylenol) rectal suppository 650 mg  650 mg Rectal Q6H PRN    hydrALAzine (Apresoline) 20 mg/mL injection 10 mg  10 mg Intravenous Q6H PRN    insulin aspart (NovoLOG) 100 Units/mL FlexPen 1-5 Units  1-5 Units Subcutaneous TID AC and HS    aspirin DR tab 81 mg  81 mg Oral Daily    atorvastatin (Lipitor) tab 80 mg  80 mg Oral Nightly    calcitriol (Rocaltrol) cap 0.25 mcg  0.25 mcg Oral Once per day on     clopidogrel (Plavix) tab 75 mg  75 mg Oral Daily    acetaminophen (Tylenol Extra Strength) tab 500 mg  500 mg Oral Q4H PRN     melatonin tab 3 mg  3 mg Oral Nightly PRN    heparin (Porcine) 5000 UNIT/ML injection 5,000 Units  5,000 Units Subcutaneous 2 times per day       LABS     Lab Results   Component Value Date    WBC 7.2 01/09/2025    HGB 11.9 01/09/2025    HCT 36.7 01/09/2025    .0 01/09/2025    CREATSERUM 4.50 01/09/2025    BUN 41 01/09/2025     01/09/2025    K 4.4 01/09/2025    CL 96 01/09/2025    CO2 25.0 01/09/2025     01/09/2025    CA 9.7 01/09/2025    ALB 3.9 01/09/2025    ALKPHO 128 01/09/2025    BILT 0.6 01/09/2025    TP 7.2 01/09/2025    AST 16 01/09/2025    ALT <7 01/09/2025    MG 2.4 01/09/2025    PHOS 4.2 01/09/2025    PGLU 183 01/09/2025       IMAGING   All imaging studies personally reviewed.    XR ND/J LONG TUBE PLACEMENT BY MD (CPT=44500/38060)   Final Result   PROCEDURE:  XR ND/J LONG TUBE PLACEMENT BY MD (CPT=44500/73139)       INDICATIONS:  Request for fluoroscopic NG tube placement.  Multiple    unsuccessful attempts by nursing staff on the floor.       DESCRIPTION OF PROCEDURE:         Procedure was explained prior to the exam.  Consent is obtained.  The    patient is placed supine on the fluoroscopic table.  Using fluoroscopic    guidance, a Dobbhoff feeding tube was placed through the right nares and    under fluoroscopic guidance was    advanced into the stomach.  The stiffener was removed.  The catheter tip    is located in the fundus of the stomach in good position.       Cumulative fluoroscopy time is 21 seconds.   The cumulative air Kerma is 46.9 ugycm   One acquired exposure image was obtained.                         =====   CONCLUSION:  Successful fluoroscopic guided placement of a Dobbhoff    feeding tube into the fundus of the stomach.           LOCATION:  [facility]               Dictated by (CST): Jamal Gates MD on 1/09/2025 at 4:19 PM        Finalized by (CST): Jamal Gates MD on 1/09/2025 at 4:49 PM         XR CHEST AP PORTABLE  (CPT=71045)   Final Result   PROCEDURE:   XR CHEST AP PORTABLE  (CPT=71045)       TECHNIQUE:  AP chest radiograph was obtained.       COMPARISON:  [facility] , XR, XR CHEST AP PORTABLE  (CPT=71045), 1/08/2025,    11:59 AM.       INDICATIONS:  Check for pneumo       PATIENT STATED HISTORY: (As transcribed by Technologist)  Patient offered    no additional history at this time.             FINDINGS:  The cardiomediastinal silhouette is within normal limits.  Mild    left basilar opacity may represent early infiltrate versus atelectasis.     Correlate clinically.  No aggressive osseous lesions are identified.                         =====   CONCLUSION:  Mild left basilar opacity may represent early infiltrate.     Correlate clinically.           LOCATION:  [facility]                       Dictated by (CST): Jacob Galeano MD on 1/08/2025 at 1:32 PM        Finalized by (CST): Jacob Galeano MD on 1/08/2025 at 1:34 PM         XR CHEST AP PORTABLE  (CPT=71045)   Final Result   PROCEDURE:  XR CHEST AP PORTABLE  (CPT=71045)       TECHNIQUE:  AP chest radiograph was obtained.       COMPARISON:  [facility] , XR, XR CHEST AP PORTABLE  (CPT=71045), 1/04/2025,    6:49 PM.       INDICATIONS:  Dobhoff placement       PATIENT STATED HISTORY: (As transcribed by Technologist)  Patient offered    no additional history at this time.             FINDINGS:  Dobbhoff feeding tube tip projects over the right lower lobe of    the lung.  No obvious pneumothorax is identified in the limited visualized    portions of the right hemithorax.  The catheter should be removed and    repositioned into the stomach.     Repeat chest radiograph is recommended after removal to assure no    pneumothorax development.                         =====   CONCLUSION:  The Dobbhoff feeding tube tip is in the right lower lobe of    the lung.  The patient's nurse, Haider, was notified of the findings and    recommendations at 1227 hours on 1/8/2025.           LOCATION:  [facility]                       Dictated by (CST):  Jamal Gates MD on 1/08/2025 at 12:27 PM        Finalized by (CST): Jamal Gates MD on 1/08/2025 at 12:30 PM         XR CHEST AP PORTABLE  (CPT=71045)   Final Result   PROCEDURE:  XR CHEST AP PORTABLE  (CPT=71045)       TECHNIQUE:  AP chest radiograph was obtained.       COMPARISON:  None.       INDICATIONS:  n/v/f       PATIENT STATED HISTORY: (As transcribed by Technologist)  Patient arrived    via EMS from [facility] for n/v/d/fever.            FINDINGS:                           =====   CONCLUSION:  Normal cardiac and mediastinal contours.  No pulmonary edema    or focal airspace consolidation.  The pleural spaces are clear.           LOCATION:  [facility]                       Dictated by (CST): Alec Oliva MD on 1/04/2025 at 7:08 PM        Finalized by (CST): Alec Oliva MD on 1/04/2025 at 7:09 PM         CT BRAIN OR HEAD (CPT=70450)   Final Result   PROCEDURE:  CT BRAIN OR HEAD (23911)       COMPARISON:  None.       INDICATIONS:  AMS       TECHNIQUE:  Noncontrast CT scanning is performed through the brain. Dose    reduction techniques were used. Dose information is transmitted to the ACR    (American College of Radiology) NRDR (National Radiology Data Registry)    which includes the Dose Index    Registry.       PATIENT STATED HISTORY: (As transcribed by Technologist)  Pt with altered    mental status nausea and vomiting.            FINDINGS:        VENTRICLES/SULCI: Diffuse sulcal and ventricular prominence concordant    with age.  No hydrocephalus.   INTRACRANIAL:  Negative for intracranial hemorrhage, mass effect, or acute    large vessel transcortical infarct.  Symmetric areas of hypoattenuation of    bilateral basal ganglia could represent prominent perivascular spaces    versus remote infarct.  Gray-white    differentiation is preserved.  Intracranial atherosclerosis.     SINUSES:           Mucoperiosteal thickening of bilateral maxillary    sinuses.  Trace right mastoid effusion.   SKULL:                No evidence for fracture or osseous abnormality.   OTHER:               No scalp hematoma.                         =====   CONCLUSION:         No acute intracranial process identified.               LOCATION:  [facility]           Dictated by (CST): Alec Oliva MD on 1/04/2025 at 6:55 PM        Finalized by (CST): Alec Oliva MD on 1/04/2025 at 6:58 PM         MRI BRAIN (CPT=70551)    (Results Pending)         ASSESSMENT & PLAN   Bg Watkins is a 64 year old male with PMH sig for ESRD on HD HTN, HLD, CVA, non-verbal and bed bound at baseline.  Presents with increased lethargy, vomiting and decreased PO intake.  Found to be positive for RSV. Nephrology is consulted for dialysis management.  Patient typically receives HD MWF at [facility]; primary nephrologist is Dr. Ridley. However patient has currently been at a facility for the last month and has been receiving dialysis 4 days per week.      ESRD on HD   - typically receives dialysis MWF, however has been receiving dialysis 4x weekly (MTThF) at his current facility.  Last dialysis on Friday 1/3 PTA  - no acute indication for RRT today   - sp HD tomorrow. UF 0-1L. HD directly managed. AVF  - avoid morphine and deirdre. Renally dose meds     Hyponatremia   - mild. Will improve w HD     Anemia   - iron stores low w %sat 13 but ferritin 1681. Avoid IV iron w infection  - transfusion prn    Fever / AMS  - sp BCx w 1/2 growing staph hominis on 1/6. Repeat BCx neg  - per primary team, ID, and neuro     Thank you for allowing me to participate in the care of this patient. Please do not hesitate to call with questions or concerns.       Tori Urias MD  [facility]phrology

## 2025-01-10 NOTE — PHYSICAL THERAPY NOTE
PHYSICAL THERAPY EVALUATION - INPATIENT     Room Number: 412/412-A  Evaluation Date: 1/10/2025  Type of Evaluation: Initial  Physician Order: PT Eval and Treat    Presenting Problem: Encephalopathy, FTT, weakness in setting of recent CVA  Co-Morbidities : ESRD on HD, DM, retinopathy, HTN, HL, Basal ganglia CVA  Reason for Therapy: Mobility Dysfunction and Discharge Planning    PHYSICAL THERAPY ASSESSMENT   Patient is a 64 year old male admitted 1/4/2025 for encephalopathy, FTT, weakness in setting of recent CVA.   Patient is currently functioning below baseline with bed mobility and transfers. Prior to admission, patient's baseline is assist of 1 for transfers to/from wheelchair w/ family assist - this is before pt's most recent CVA. Pt was admitted from [facility]     Patient is unable to follow commands or participate in active movement.  He is not a skilled PT candidate at this time.  Will have restorative follow for ROM as appropriate.    PLAN  Patient has been evaluated and presents with no skilled Physical Therapy needs at this time.  Patient discharged from Physical Therapy services.  Please re-order if a new functional limitation presents during this admission.    PT Device Recommendation: Mechanical lift    GOALS  Patient was able to achieve the following goals ...    Patient was able to transfer Unable to complete since most recent CVA   Patient able to ambulate on level surfaces Unable before admission     HOME SITUATION  Type of Home: House  Home Layout: One level           Stairs to Bedroom: 0         Lives With: Spouse    Drives: No         Prior Level of [place]: Per chart review and discussion w/ staff, pt was transferring to w/c w/ family assist and able to take a few steps before more recent CVA.  Has not been able to complete transfers since then and was admitted from [facility]    SUBJECTIVE  Pt opening eyes briefly during session, but non-verbal otherwise.  Did appear to try and answer his name once  when asked.    OBJECTIVE  Precautions:  (American speaking, wrist brace on R side)  Fall Risk: High fall risk    WEIGHT BEARING RESTRICTION     PAIN ASSESSMENT  Rating: Other (Comment)  Location: CPOT 2/8 w/ passive cervical extension  Management Techniques: Repositioning    COGNITION  Overall Cognitive Status:  Impaired  Arousal/Alertness:  lethargic  Pt unable to follow commands in either English or American, moved lips once when asked his name.  Not responding to verbal or tactile cues either.    RANGE OF MOTION AND STRENGTH ASSESSMENT  Upper extremity ROM and strength -see OT evaluation    Lower extremity ROM is within functional limits except for the following: B ankles are 10 degrees short of neutral dorsiflexion.  Hips and knees are wfl passively    Lower extremity strength - 0/5 throughout (ue's as well).  No active movement observed in any limb throughout session, including transfer to eob.    BALANCE  Static Sitting: Dependent  Dynamic Sitting: Not tested  Static Standing: Not tested  Dynamic Standing: Not tested    ADDITIONAL TESTS  Additional Tests: Modified [place]              Modified [place]: 5                  ACTIVITY TOLERANCE                         O2 WALK       NEUROLOGICAL FINDINGS                        AM-PAC '6-Clicks' INPATIENT SHORT FORM - BASIC MOBILITY  How much difficulty does the patient currently have...  Patient Difficulty: Turning over in bed (including adjusting bedclothes, sheets and blankets)?: Unable   Patient Difficulty: Sitting down on and standing up from a chair with arms (e.g., wheelchair, bedside commode, etc.): Unable   Patient Difficulty: Moving from lying on back to sitting on the side of the bed?: Unable   How much help from another person does the patient currently need...   Help from Another: Moving to and from a bed to a chair (including a wheelchair)?: Total   Help from Another: Need to walk in hospital room?: Total   Help from Another: Climbing 3-5 steps with a  railing?: Total       AM-PAC Score:  Raw Score: 6   Approx Degree of Impairment: 100%   Standardized Score (AM-PAC Scale): 23.55   CMS Modifier (G-Code): CN    FUNCTIONAL ABILITY STATUS  Gait Assessment   Functional Mobility/Gait Assessment  Gait Assistance: Not tested    Skilled Therapy Provided     Bed Mobility:  Rolling: total assist  Supine to sit: Total assist, once in sitting pt was uanble to engage his core at all in order to support his posture.  Completed passive stretch to head to improve neutral head position, but pt has very forward flexed head posture.  Able to move him about 10 degrees and then need to stop due to painful reaction.  Had pt sit on eob for about 3 minutes w/o any increase in response to movement.   Sit to supine: Total assist     Transfer Mobility:  Sit to stand: NT   Stand to sit: NT  Gait = NT    Therapist's comments: Once returned to supine, worked on positioning pt in semi-dodson position w/ head held in more neutral posture.  Using a bit of gravity to assist w/ natural head extension and towel in place to hold pt's head more centered rather than flexed/turned to L.    Exercise/Education Provided:  Bed mobility  Functional activity tolerated  [place]'s stretch 20 seconds ea side, 5 reps heel slides and hip ab/add bilaterally.    Patient End of Session: In bed;Needs met;RN aware of session/findings    Patient Evaluation Complexity Level:  History Moderate - 1 or 2 personal factors and/or co-morbidities   Examination of body systems Moderate - addressing a total of 3 or more elements   Clinical Presentation  Moderate - Evolving   Clinical Decision Making Low Complexity       PT Session Time: 20 minutes  Gait Trainin minutes  Therapeutic Activity: 10 minutes  Neuromuscular Re-education: 0 minutes  Therapeutic Exercise: 4 minutes

## 2025-01-10 NOTE — HOSPICE RN NOTE
[facility] RN met with the POA and other siblings to discuss comfort care. Informational meeting complete. Hospice philosophy, Medicare benefit, and levels of care discussed. All questions answered. The family would like to proceed with hospice at this time. The POA/Sister Maria Del Rosario signed hospice consents and an updated POLST. They are in agreement to stop HD after todays treatment, and wean TF. Working on flipping the chart into inpatient hospice, and when bella will transition to home with family. [facility] will follow to support the patient and family.       Taty Cotton RN  [facility] RN Liaison  [phone] (After-hours)

## 2025-01-10 NOTE — PROGRESS NOTES
[facility]   part of [facility] Infectious Disease Progress Note    Bg Watkins Patient Status:  Inpatient    10/27/1960 MRN [id number]   [facility] 4NW-A Attending Duran Redding MD   Hosp Day # 6 PCP April Simmons MD     Subjective:  Pt sleeping comfortably, NAD.     Objective:    Allergies:  Allergies[1]    Medications:    Current Facility-Administered Medications:     sodium chloride 0.9 % IV bolus 100 mL, 100 mL, Intravenous, Q30 Min PRN **AND** albumin human (Albumin) 25% injection 25 g, 25 g, Intravenous, PRN Dialysis    ampicillin-sulbactam (Unasyn) 1.5 g in sodium chloride 0.9% 100mL IVPB-MBP, 1.5 g, Intravenous, q12h    lacosamide (Vimpat) 200 mg/20mL injection 100 mg, 100 mg, Intravenous, Q12H    ipratropium-albuterol (Duoneb) 0.5-2.5 (3) MG/3ML inhalation solution 3 mL, 3 mL, Nebulization, QID    acetaminophen (Tylenol) rectal suppository 650 mg, 650 mg, Rectal, Q6H PRN    hydrALAzine (Apresoline) 20 mg/mL injection 10 mg, 10 mg, Intravenous, Q6H PRN    insulin aspart (NovoLOG) 100 Units/mL FlexPen 1-5 Units, 1-5 Units, Subcutaneous, TID AC and HS    aspirin DR tab 81 mg, 81 mg, Oral, Daily    atorvastatin (Lipitor) tab 80 mg, 80 mg, Oral, Nightly    calcitriol (Rocaltrol) cap 0.25 mcg, 0.25 mcg, Oral, Once per day on     clopidogrel (Plavix) tab 75 mg, 75 mg, Oral, Daily    acetaminophen (Tylenol Extra Strength) tab 500 mg, 500 mg, Oral, Q4H PRN    melatonin tab 3 mg, 3 mg, Oral, Nightly PRN    heparin (Porcine) 5000 UNIT/ML injection 5,000 Units, 5,000 Units, Subcutaneous, 2 times per day    Physical Exam:  General: Sleeping.  No apparent distress.  Vital Signs:  Blood pressure 102/46, pulse 68, temperature 97.4 °F (36.3 °C), temperature source Axillary, resp. rate 16, height 172 cm (5' 7.72\"), weight 169 lb 12.1 oz (77 kg), SpO2 94%.   Temp (24hrs), Av.2 °F (36.8 °C), Min:97.4 °F (36.3 °C), Max:99.4 °F (37.4 °C)      HEENT: Exam  is unremarkable. NG in place  Lungs: Clear to auscultation bilaterally.  Cardiac: Regular rate and rhythm. No murmur.  Abdomen:  Soft, non-distended, non-tender, with no rebound or guarding.   Extremities:  No lower extremity edema noted.  Without clubbing or cyanosis.    Skin: Normal texture and turgor.  Neurologic: Cranial nerves are grossly intact.     Labs:  Lab Results   Component Value Date    WBC 9.8 01/10/2025    HGB 8.6 01/10/2025    HCT 25.5 01/10/2025    .0 01/10/2025    CREATSERUM 6.47 01/10/2025    BUN 74 01/10/2025     01/10/2025    K 4.5 01/10/2025    CL 99 01/10/2025    CO2 25.0 01/10/2025     01/10/2025    CA 9.3 01/10/2025    ALB 3.7 01/10/2025    ALKPHO 113 01/10/2025    BILT 0.4 01/10/2025    TP 6.6 01/10/2025    AST 21 01/10/2025    ALT <7 01/10/2025    MG 2.4 01/10/2025    PHOS 3.6 01/10/2025         Assessment/Plan:    1.  AMS, fever with acute hypoxic respiratory failure  -on 4L NC  -occasional low grade temps, fever curve improving  -positive RSV  -also with PNA, CXR with L basilar opacity  -on IV unasyn d#3  2.  Positive blood culture  -since culture with staph hominis  -repeat NG  -likely contamination  3.  ESRD on HD  4.  Hx of CVA  -aspiration risk  -NG in place, on tube feeds      If you have any questions or concerns please call [facility] Infectious Disease at [phone].     Thiago Olguin, APRN          [1] No Known Allergies

## 2025-01-10 NOTE — PROGRESS NOTES
[facility]  [facility] Neurology Progress Note    Bg Watkins Patient Status:  Inpatient    10/27/1960 MRN [id number]   Location [facility] 4NW-A Attending Duran Redding MD   Hosp Day # 6 PCP April Simmons MD     CC: AMS    Subjective:  Seen for a follow up visit today. Sitting up  in bed, alert. Non verbal. No acute events overnight. Appears comfortable. Tracking with eyes this writer. Regards and follows simple commands. Denies pain. No reports of seizures or seizure-like activity.         MEDICATIONS:  No current outpatient medications on file.     Current Facility-Administered Medications   Medication Dose Route Frequency    sodium chloride 0.9 % IV bolus 100 mL  100 mL Intravenous Q30 Min PRN    And    albumin human (Albumin) 25% injection 25 g  25 g Intravenous PRN Dialysis    ampicillin-sulbactam (Unasyn) 1.5 g in sodium chloride 0.9% 100mL IVPB-MBP  1.5 g Intravenous q12h    lacosamide (Vimpat) 200 mg/20mL injection 100 mg  100 mg Intravenous Q12H    ipratropium-albuterol (Duoneb) 0.5-2.5 (3) MG/3ML inhalation solution 3 mL  3 mL Nebulization QID    acetaminophen (Tylenol) rectal suppository 650 mg  650 mg Rectal Q6H PRN    hydrALAzine (Apresoline) 20 mg/mL injection 10 mg  10 mg Intravenous Q6H PRN    insulin aspart (NovoLOG) 100 Units/mL FlexPen 1-5 Units  1-5 Units Subcutaneous TID AC and HS    aspirin DR tab 81 mg  81 mg Oral Daily    atorvastatin (Lipitor) tab 80 mg  80 mg Oral Nightly    calcitriol (Rocaltrol) cap 0.25 mcg  0.25 mcg Oral Once per day on     clopidogrel (Plavix) tab 75 mg  75 mg Oral Daily    acetaminophen (Tylenol Extra Strength) tab 500 mg  500 mg Oral Q4H PRN    melatonin tab 3 mg  3 mg Oral Nightly PRN    heparin (Porcine) 5000 UNIT/ML injection 5,000 Units  5,000 Units Subcutaneous 2 times per day       REVIEW OF SYSTEMS:  A 10-point system was reviewed.  Pertinent positives and negatives are noted in HPI.      PHYSICAL  EXAMINATION:  VITAL SIGNS: /46 (BP Location: Left arm)   Pulse 68   Temp 97.4 °F (36.3 °C) (Axillary)   Resp 16   Ht 67.72\"   Wt 169 lb 12.1 oz (77 kg)   SpO2 94%   BMI 26.03 kg/m²   GENERAL:  Patient is a 64 year old male in no acute distress.  HEENT:  Normocephalic, atraumatic  ABD: Soft, non tender  SKIN: Warm, dry, no rashes    NEUROLOGICAL:   Mental status: Awake, eyes are open, regards and follows some simple commands, some are via imitating.   Speech: Non verbal, not answering questions.   Memory and comprehension: MJ, non verbal  Cranial Nerves: PERRL 3mm brisk, EOMI, no nystagmus, facial sensation intact, face symmetric, tongue midline, shoulder shrug equal, remainder CN grossly intact.  Motor: SANON, withdraws to painful stimuli throughout.   Sensory: Grimaces to painful stimuli  Gait: Deferred      Imaging/Diagnostics:  XR ND/J LONG TUBE PLACEMENT BY MD (CPT=44500/69821)    Result Date: 1/9/2025  CONCLUSION:  Successful fluoroscopic guided placement of a Dobbhoff feeding tube into the fundus of the stomach.   LOCATION:  [facility]    Dictated by (CST): Jamal Gates MD on 1/09/2025 at 4:19 PM     Finalized by (CST): Jamal Gates MD on 1/09/2025 at 4:49 PM       XR CHEST AP PORTABLE  (CPT=71045)    Result Date: 1/8/2025  CONCLUSION:  Mild left basilar opacity may represent early infiltrate.  Correlate clinically.   LOCATION:  [id number]      Dictated by (CST): Jacob Galeano MD on 1/08/2025 at 1:32 PM     Finalized by (CST): Jacob Galeano MD on 1/08/2025 at 1:34 PM       XR CHEST AP PORTABLE  (CPT=71045)    Result Date: 1/8/2025  CONCLUSION:  The Dobbhoff feeding tube tip is in the right lower lobe of the lung.  The patient's nurse, Haider, was notified of the findings and recommendations at 1227 hours on 1/8/2025.   LOCATION:  [facility]      Dictated by (CST): Jamal Gates MD on 1/08/2025 at 12:27 PM     Finalized by (CST): Jamal Gates MD on 1/08/2025 at 12:30 PM       XR CHEST AP  PORTABLE  (CPT=71045)    Result Date: 1/4/2025  CONCLUSION:  Normal cardiac and mediastinal contours.  No pulmonary edema or focal airspace consolidation.  The pleural spaces are clear.   LOCATION:  [facility]      Dictated by (CST): Alec Oliva MD on 1/04/2025 at 7:08 PM     Finalized by (CST): Alec Oliva MD on 1/04/2025 at 7:09 PM       CT BRAIN OR HEAD (CPT=70450)    Result Date: 1/4/2025  CONCLUSION:   No acute intracranial process identified.    LOCATION:  [facility]   Dictated by (CST): Alec Oliva MD on 1/04/2025 at 6:55 PM     Finalized by (CST): Alec Oliva MD on 1/04/2025 at 6:58 PM          Labs:  Recent Labs   Lab 01/08/25  0631 01/09/25  0531 01/10/25  0637   RBC 2.80* 3.78* 2.71*   HGB 8.7* 11.9* 8.6*   HCT 26.9* 36.7* 25.5*   MCV 96.1 97.1 94.1   MCH 31.1 31.5 31.7   MCHC 32.3 32.4 33.7   RDW 15.9 16.0 15.8   NEPRELIM 7.26 5.37 7.42   WBC 8.6 7.2 9.8   .0 185.0 178.0         Recent Labs   Lab 01/08/25  0631 01/09/25  0531 01/10/25  0637   * 137* 258*   BUN 73* 41* 74*   CREATSERUM 8.06* 4.50* 6.47*   EGFRCR 7* 14* 9*   CA 9.7 9.7 9.3    139 138   K 4.6 4.4 4.5    96* 99   CO2 24.0 25.0 25.0         EEG 1/8/2025     Impression:  Abnormal EEG.  Generalized sharp wave activity, as mentioned above were noted.  These waveforms are epileptogenic in nature.     Moderate diffuse slowing into delta and theta range was noted.  This constellation of findings can be seen in encephalopathy due to metabolic/toxic etiology, medication effects or diffuse cerebral injury.  Clinical correlation is recommended.     Assessment and Plan:     A 64 year old male with:     Encephalopathy, likely multifactorial, secondary to metabolic/toxic etiology, complicated by underlying medical problems including uremia and RSV, and hypoxia/pneumonia.  Seizure-like activity  in the differential as well, given infection and hypoxia lowering seizure threshold. Investigations:  -  EEG - revealed generalized sharp wave  activity, epileptogenic in nature. No electrographic seizures seen.  -  Started on Vimpat 100 mg IV twice daily for seizure prophylaxis. Continue.   -  Seizure precautions.   -  Ammonia level and TSH were normal  -  MRI brain - pending, family is deciding about hospice care at this time, likely will change to comfort care. MRI can be cancelled in that case.   Previous history of stroke. Continue aspirin and statin for stroke prophylaxis. OT/PT.  Dysphagia. Patient has swallowing difficulties. Now with DH tube for TF and meds.  Recent RSV infection with respiratory insufficiency.  Management as per hospitalist.  End-stage renal disease on hemodialysis. Nephrology following. Palliative care/hospice following.   Case discussed with CHERYL HUGHES, all questions answered. Discussed with dr. Garzon.      Is this a shared or split note between Advanced Practice Provider and Physician? Yes       Alondra CAMEJO  [facility]  1/10/2025, 2:56 PM   [facility] # [id number]

## 2025-01-10 NOTE — HOSPICE RN NOTE
[facility] RN admitted the patient to general inpatient hospice per Dr. Redding and Dr. Wilson. Patient is eligible for general inpatient hospice care for symptom management of pain, respiratory distress/anxiety, airway secretions requiring frequent nursing assessments and interventions including titration of IV medications.Patient has been requiring HD, and the family has agreed to stop HD , tube feeds will be weaned. If patient's symptoms can be managed, the family would like to have the patient transitioned home with hospice.    Chart flipped.    Comfort order set placed. Medications: Dilaudid IVP, and gtt as needed for pain/dyspnea, Scopolamine patch, Robinul and Lasix for secretions, Ativan for anxiety/agitation/seizures.    Regular diet with pleasure feeding. Patient has tube feeds via Dobhoff currently at goal 50mlhr continuous, will wean down 10ml every 4 hours until stopped. 150ml free water flushes every 4 hrs.    Aspiration, seizure, and fall precautions in place.    PPS: 20%    POC discussed with the patient's siblings and Denise RIVERA. All are in agreement. [facility] to follow daily to support the patient and family.     Taty Cotton RN  [facility] RN Liaison  [phone] (After-hours)

## 2025-01-10 NOTE — SLP NOTE
Attempted to see for follow up with recommended diet however patient currently NPO. Will re-attempt as appropriate.    Milagros Merrill MA, CCC-SLP  Pager [phone]

## 2025-01-10 NOTE — PROGRESS NOTES
[facility]   part of [facility]  Palliative Care Follow Up    Bg Watkins Patient Status:  Inpatient    10/27/1960 MRN [id number]   Location [facility] 4NW-A Attending Duran Redding MD   Hosp Day # 6 PCP April Simmons MD   [id number]    Date of visit:  1/10/2025  Day 6 of hospitalization.        Summary:         Bg Watkins is a 64 year old male with PMH significant for ESRD on HD, HTN, CVA - bedbound and nonverbal in addition to the other conditions noted below, presented to ED on 2025 with CC of AMS, reduced PO intake, and found to be RSV +. Ct neg for acute process. Patient is undergoing evaluation and treatment for RSV infection, acute hypoxia w mucous plugging, leukocytosis, encephalopathy, ESRD. Code status was explored w sister / POA on  and limits were set for DNAR, DNI. He is receiving HD at time of visit, but per HD tech, hypotensive so limited fluid removal and had received albumin x1.     Palliative care is following for support with goals of care.    Interval Events: TF initiated per DHT .  Receiving HD today, per tech plan to attempt 1L off    SUBJECTIVE  Review of Systems - Palliative Care Symptom Assessment     I visited Bg. He attempted to open his eyes in response to my voice. Less responsive today.    Family in waiting room.    OBJECTIVE     Hematology:   Lab Results   Component Value Date    WBC 9.8 01/10/2025    HGB 8.6 (L) 01/10/2025    HCT 25.5 (L) 01/10/2025    .0 01/10/2025       Chemistry:  Lab Results   Component Value Date    CREATSERUM 6.47 (H) 01/10/2025    BUN 74 (H) 01/10/2025     01/10/2025    K 4.5 01/10/2025    CL 99 01/10/2025    CO2 25.0 01/10/2025     (H) 01/10/2025    CA 9.3 01/10/2025    ALB 3.7 01/10/2025    ALKPHO 113 01/10/2025    BILT 0.4 01/10/2025    TP 6.6 01/10/2025    AST 21 01/10/2025    ALT <7 (L) 01/10/2025    PSA 1.01 2021    MG 2.4 01/10/2025    PHOS 3.6 01/10/2025       Imaging:  XR ND/J LONG  TUBE PLACEMENT BY MD (CPT=44500/97796)    Result Date: 1/9/2025  CONCLUSION:  Successful fluoroscopic guided placement of a Dobbhoff feeding tube into the fundus of the stomach.   LOCATION:  [facility]    Dictated by (CST): Jamal Gates MD on 1/09/2025 at 4:19 PM     Finalized by (CST): Jamal Gates MD on 1/09/2025 at 4:49 PM        Vital Signs:  Blood pressure 102/46, pulse 68, temperature 97.4 °F (36.3 °C), temperature source Axillary, resp. rate 16, height 5' 7.72\" (1.72 m), weight 169 lb 12.1 oz (77 kg), SpO2 94%.  Body mass index is 26.03 kg/m².    Physical Exam:     GENERAL: Lethargic, less responsive today. NAD.  HEENT: did not note airway congestion today  CARDIAC: HR 60s per tele, hypertensive  RESPIRATORY: no increased effort at rest  NEUROLOGIC: attempting to open eyes to voice/stim.   SKIN: Warm and dry.     Palliative Performance Scale: 20%   Palliative Performance Scale   % Ambulation Activity Level Self-Care Intake Consciousness   100 Full Normal Full   Normal Full   90 Full No disease  Normal Full Normal Full   80 Full Some disease  Normal w/effort Full Normal or  Reduced Full   70 Reduced Some disease  Can't perform job Full Normal or   Reduced Full   60 Reduced Significant disease  Can't perform hobby Occasional  Assist Normal or   Reduced Full or confused   50 Mainly sit/lie Extensive Disease  Can't do any work Partial Assist Normal or Reduced Full or confused   40 Mainly in bed Extensive Disease  Can't do any work Mainly Assist Normal or Reduced Full or confused   30 Bedbound Extensive Disease  Can't do any work Max Assist  Total Care Reduced Drowsy/confused   20 Bedbound Extensive Disease  Can't do any work Max Assist  Total Care Minimal Drowsy/confused   10 Bedbound/coma Extensive Disease  Can't do any work  coma  Max Assist  Total Care Mouth care Drowsy or coma   0 Death       Palliative Care Assessment:     Goals of Care Discussion:     Met with Bg's family in family waiting room.  Provided medical update.   They see his condition worsening again today. He is not engaging as he was yesterday, family concerned he is suffering from further seizures and possible stroke. They are aware of pending MRI brain.     They called [facility] to request a formal evaluation, and to support their consideration for comfort care. They understand he will be evaluated for GIP vs routine, and if routine, they will ask for support w finding a facility instead of [facility], if possible.     They are aware they can set limitations to his care while they explore hospice (for example, not pursuing MRI brain or other workup).       Emotional support provided.    Problem List:       Principal Problem:    Encephalopathy due to other virus  Active Problems:    Encephalopathy    Weakness generalized    FTT (failure to thrive) in adult    Palliative care encounter    Counseling regarding advance care planning and goals of care  ESRD on HD    Palliative Care Recommendations/Plan:         Goals of Care:    Family states they see his condition deteriorating again today, and they do not see him being able to recover to a QOL of life that he would want.  They have called hospice to have formal evaluation today, and likely to enroll.   If not GIP, then would want to pursue an alternative SNF site, if possible.  They are aware they can decline further testing (including MRI brain, labs, etc) while they are considering hospice care.    Code Status: DNAR/Selective Treatment.   Discussed to confirm w sister Maria Del Rosario and other siblings on 1/6.   - PEG not in line w GOC.  POLST - deferred as GOC are being defined.     HCPOA: Healthcare Agent's Name: Maria Del Rosario Mckenzie    Disposition:  Pending clinical course, hospice eval.      A total of 25 minutes were spent on this visit, which included all of the following:  Chart review, direct face to face contact, history taking, physical examination, counseling and coordinating care, and  documentation.        Reviewed the above with patient's RN.    Thank you for inviting Palliative Care to participate in the care of Bg Watkins and family.       Will sign off as notified of admission to [facility] hospice by hospice RN.      BASHIR Thakkar  Palliative Care    1/10/2025  2:30 PM    The 21st Century Cures Act makes medical notes like these available to patients in the interest of transparency. Please be advised this is a medical document. Medical documents are intended to carry relevant information, facts as evident, and the clinical opinion of the practitioner. The medical note is intended as a peer to peer communication, and may appear blunt or direct. It is written in medical language and may contain abbreviations or verbiage that are unfamiliar.

## 2025-01-10 NOTE — PROGRESS NOTES
[facility]   part of [facility]    Nephrology Progress Note    Bg Watkins Attending:  Duran Redding MD     Cc: esrd    SUBJECTIVE      No acute complaints.     PHYSICAL EXAM   Vital signs: /46 (BP Location: Left arm)   Pulse 68   Temp 97.4 °F (36.3 °C) (Axillary)   Resp 16   Ht 5' 7.72\" (1.72 m)   Wt 169 lb 12.1 oz (77 kg)   SpO2 94%   BMI 26.03 kg/m²   Temp (24hrs), Av °F (36.7 °C), Min:97.4 °F (36.3 °C), Max:99 °F (37.2 °C)       Intake/Output Summary (Last 24 hours) at 1/10/2025 1447  Last data filed at 1/10/2025 0655  Gross per 24 hour   Intake 540 ml   Output 0 ml   Net 540 ml     Wt Readings from Last 3 Encounters:   25 169 lb 12.1 oz (77 kg)   22 169 lb 12.8 oz (77 kg)   21 167 lb 12.8 oz (76.1 kg)     General: NAD  HEENT: NCAT  Cardiac: RRR  Lungs: no distress  Extremities: No edema  Neurologic: awake  Skin: warm, dry     MEDS     Current Facility-Administered Medications   Medication Dose Route Frequency    sodium chloride 0.9 % IV bolus 100 mL  100 mL Intravenous Q30 Min PRN    And    albumin human (Albumin) 25% injection 25 g  25 g Intravenous PRN Dialysis    ampicillin-sulbactam (Unasyn) 1.5 g in sodium chloride 0.9% 100mL IVPB-MBP  1.5 g Intravenous q12h    lacosamide (Vimpat) 200 mg/20mL injection 100 mg  100 mg Intravenous Q12H    ipratropium-albuterol (Duoneb) 0.5-2.5 (3) MG/3ML inhalation solution 3 mL  3 mL Nebulization QID    acetaminophen (Tylenol) rectal suppository 650 mg  650 mg Rectal Q6H PRN    hydrALAzine (Apresoline) 20 mg/mL injection 10 mg  10 mg Intravenous Q6H PRN    insulin aspart (NovoLOG) 100 Units/mL FlexPen 1-5 Units  1-5 Units Subcutaneous TID AC and HS    aspirin DR tab 81 mg  81 mg Oral Daily    atorvastatin (Lipitor) tab 80 mg  80 mg Oral Nightly    calcitriol (Rocaltrol) cap 0.25 mcg  0.25 mcg Oral Once per day on     clopidogrel (Plavix) tab 75 mg  75 mg Oral Daily    acetaminophen (Tylenol Extra  Strength) tab 500 mg  500 mg Oral Q4H PRN    melatonin tab 3 mg  3 mg Oral Nightly PRN    heparin (Porcine) 5000 UNIT/ML injection 5,000 Units  5,000 Units Subcutaneous 2 times per day       LABS     Lab Results   Component Value Date    WBC 9.8 01/10/2025    HGB 8.6 01/10/2025    HCT 25.5 01/10/2025    .0 01/10/2025    CREATSERUM 6.47 01/10/2025    BUN 74 01/10/2025     01/10/2025    K 4.5 01/10/2025    CL 99 01/10/2025    CO2 25.0 01/10/2025     01/10/2025    CA 9.3 01/10/2025    ALB 3.7 01/10/2025    ALKPHO 113 01/10/2025    BILT 0.4 01/10/2025    TP 6.6 01/10/2025    AST 21 01/10/2025    ALT <7 01/10/2025    MG 2.4 01/10/2025    PHOS 3.6 01/10/2025    PGLU 253 01/10/2025     IMAGING     XR ND/J LONG TUBE PLACEMENT BY MD (CPT=44500/72741)   Final Result   PROCEDURE:  XR ND/J LONG TUBE PLACEMENT BY MD (CPT=44500/10669)       INDICATIONS:  Request for fluoroscopic NG tube placement.  Multiple    unsuccessful attempts by nursing staff on the floor.       DESCRIPTION OF PROCEDURE:         Procedure was explained prior to the exam.  Consent is obtained.  The    patient is placed supine on the fluoroscopic table.  Using fluoroscopic    guidance, a Dobbhoff feeding tube was placed through the right nares and    under fluoroscopic guidance was    advanced into the stomach.  The stiffener was removed.  The catheter tip    is located in the fundus of the stomach in good position.       Cumulative fluoroscopy time is 21 seconds.   The cumulative air Kerma is 46.9 ugycm   One acquired exposure image was obtained.                         =====   CONCLUSION:  Successful fluoroscopic guided placement of a Dobbhoff    feeding tube into the fundus of the stomach.           LOCATION:  [facility]               Dictated by (CST): Jamal Gates MD on 1/09/2025 at 4:19 PM        Finalized by (CST): Jamal Gates MD on 1/09/2025 at 4:49 PM         XR CHEST AP PORTABLE  (CPT=71045)   Final Result   PROCEDURE:   XR CHEST AP PORTABLE  (CPT=71045)       TECHNIQUE:  AP chest radiograph was obtained.       COMPARISON:  [facility] , XR, XR CHEST AP PORTABLE  (CPT=71045), 1/08/2025,    11:59 AM.       INDICATIONS:  Check for pneumo       PATIENT STATED HISTORY: (As transcribed by Technologist)  Patient offered    no additional history at this time.             FINDINGS:  The cardiomediastinal silhouette is within normal limits.  Mild    left basilar opacity may represent early infiltrate versus atelectasis.     Correlate clinically.  No aggressive osseous lesions are identified.                         =====   CONCLUSION:  Mild left basilar opacity may represent early infiltrate.     Correlate clinically.           LOCATION:  [facility]                       Dictated by (CST): Jacob Galeano MD on 1/08/2025 at 1:32 PM        Finalized by (CST): Jacob Galeano MD on 1/08/2025 at 1:34 PM         XR CHEST AP PORTABLE  (CPT=71045)   Final Result   PROCEDURE:  XR CHEST AP PORTABLE  (CPT=71045)       TECHNIQUE:  AP chest radiograph was obtained.       COMPARISON:  [facility] , XR, XR CHEST AP PORTABLE  (CPT=71045), 1/04/2025,    6:49 PM.       INDICATIONS:  Dobhoff placement       PATIENT STATED HISTORY: (As transcribed by Technologist)  Patient offered    no additional history at this time.             FINDINGS:  Dobbhoff feeding tube tip projects over the right lower lobe of    the lung.  No obvious pneumothorax is identified in the limited visualized    portions of the right hemithorax.  The catheter should be removed and    repositioned into the stomach.     Repeat chest radiograph is recommended after removal to assure no    pneumothorax development.                         =====   CONCLUSION:  The Dobbhoff feeding tube tip is in the right lower lobe of    the lung.  The patient's nurse, Haider, was notified of the findings and    recommendations at 1227 hours on 1/8/2025.           LOCATION:  [facility]                       Dictated by (CST):  Jamal Gates MD on 1/08/2025 at 12:27 PM        Finalized by (CST): Jamal Gates MD on 1/08/2025 at 12:30 PM         XR CHEST AP PORTABLE  (CPT=71045)   Final Result   PROCEDURE:  XR CHEST AP PORTABLE  (CPT=71045)       TECHNIQUE:  AP chest radiograph was obtained.       COMPARISON:  None.       INDICATIONS:  n/v/f       PATIENT STATED HISTORY: (As transcribed by Technologist)  Patient arrived    via EMS from [facility] for n/v/d/fever.            FINDINGS:                           =====   CONCLUSION:  Normal cardiac and mediastinal contours.  No pulmonary edema    or focal airspace consolidation.  The pleural spaces are clear.           LOCATION:  [facility]                       Dictated by (CST): Alec Oliva MD on 1/04/2025 at 7:08 PM        Finalized by (CST): Alec Oliva MD on 1/04/2025 at 7:09 PM         CT BRAIN OR HEAD (CPT=70450)   Final Result   PROCEDURE:  CT BRAIN OR HEAD (29656)       COMPARISON:  None.       INDICATIONS:  AMS       TECHNIQUE:  Noncontrast CT scanning is performed through the brain. Dose    reduction techniques were used. Dose information is transmitted to the ACR    (American College of Radiology) NRDR (National Radiology Data Registry)    which includes the Dose Index    Registry.       PATIENT STATED HISTORY: (As transcribed by Technologist)  Pt with altered    mental status nausea and vomiting.            FINDINGS:        VENTRICLES/SULCI: Diffuse sulcal and ventricular prominence concordant    with age.  No hydrocephalus.   INTRACRANIAL:  Negative for intracranial hemorrhage, mass effect, or acute    large vessel transcortical infarct.  Symmetric areas of hypoattenuation of    bilateral basal ganglia could represent prominent perivascular spaces    versus remote infarct.  Gray-white    differentiation is preserved.  Intracranial atherosclerosis.     SINUSES:           Mucoperiosteal thickening of bilateral maxillary    sinuses.  Trace right mastoid effusion.   SKULL:                No evidence for fracture or osseous abnormality.   OTHER:               No scalp hematoma.                         =====   CONCLUSION:         No acute intracranial process identified.               LOCATION:  [facility]           Dictated by (CST): Alec Oliva MD on 1/04/2025 at 6:55 PM        Finalized by (CST): Alec Oliva MD on 1/04/2025 at 6:58 PM         MRI BRAIN (CPT=70551)    (Results Pending)       ASSESSMENT & PLAN     Bg Watkins is a 64 year old male with PMH sig for ESRD on HD HTN, HLD, CVA, non-verbal and bed bound at baseline.  Presents with increased lethargy, vomiting and decreased PO intake.  Found to be positive for RSV. Nephrology is consulted for dialysis management.  Patient typically receives HD MWF at [facility]; primary nephrologist is Dr. Ridley. However patient has currently been at a facility for the last month and has been receiving dialysis 4 days per week.      ESRD on HD   - typically receives dialysis MWF, however has been receiving dialysis 4x weekly (MTThF) at his current facility.   - HD today     Anemia   - iron stores low w %sat 13 but ferritin 1681. Avoid IV iron w infection  - transfusion prn    Fever / AMS  - sp BCx w 1/2 growing staph hominis on 1/6. Repeat BCx neg  - per primary team, ID, and neuro     Thank you for allowing me to participate in the care of this patient. Please do not hesitate to call with questions or concerns.     Hazel Madera DO  [facility] - Nephrology

## 2025-01-10 NOTE — PLAN OF CARE
Patient is alert. Following commands. Moves hand. None verbal. Eyes tracking RN at the room. Afebrile. O2 at 3L/NC. With intact dobhoff tube for continuous feeding of Nepro 30ml/hr increase 10ml q 6 hours to goal of 50ml/hr. Call light within reach. Safety precautions in place. Needs attended.     Problem: Diabetes/Glucose Control  Goal: Glucose maintained within prescribed range  Description: INTERVENTIONS:  - Monitor Blood Glucose as ordered  - Assess for signs and symptoms of hyperglycemia and hypoglycemia  - Administer ordered medications to maintain glucose within target range  - Assess barriers to adequate nutritional intake and initiate nutrition consult as needed  - Instruct patient on self management of diabetes  Outcome: Progressing

## 2025-01-11 RX ORDER — GLYCOPYRROLATE 0.2 MG/ML
0.4 INJECTION, SOLUTION INTRAMUSCULAR; INTRAVENOUS EVERY 4 HOURS
Status: DISCONTINUED | OUTPATIENT
Start: 2025-01-11 | End: 2025-01-12

## 2025-01-11 NOTE — SPIRITUAL CARE NOTE
Spiritual Care Visit Note    Patient Name: Bg Watkins Date of Spiritual Care Visit: 01/10/25   : 10/27/1960 Primary Dx: <principal problem not specified>       Referred By:      Spiritual Care Taxonomy:              Interventions: Provide grief resources;Provide compassionate touch;Provide access to a quiet place;Share words of hope and inspiration;Share written prayer;Provde spiritual/Buddhist resources;Perform a Buddhist rite or ritual;Discuss concerns;Ask guided questions about cultural and Buddhist values    Visit Type/Summary:     - Spiritual Care: Patient and family expressed appreciation for  visit. Provided information regarding how to contact Spiritual Care and left a Spiritual Care information card. In Polish,  provided support to Bg and his family present. They were most appreciative.  brought up anointing. They said they would call their own parish, [place], and ask  to provide anointing in Polish.  remains available as needed for follow up.    Spiritual Care support can be requested via an Epic consult. For urgent/immediate needs, please contact the On Call  at: [facility]: ext [phone]

## 2025-01-11 NOTE — HOSPICE RN NOTE
[facility] Inpatient Nursing Rounds:     Patient seen at bedside with family present. Patient was put on HF NC 7L overnight for dyspnea, the TF were not weaned as instructed, Dobbhoff to be removed and feeds stopped, [place] RIVERA in agreement. Vimpat oral will be discontinued, Robinul IV scheduled, possibly initiating the Dilaudid gtt to wean off HF NC discussed with RN. Patient required frequent suctioning overnight and Ofirmev for fever.      Scopolamine patch, Robinul IVP X 2, Ofirmev IV X 1, Dilaudid IVP X 1 all in the past 24 hrs.      PPS: 10%    Patient remains eligible for general inpatient hospice care for symptom management of pain, dyspnea, anxiety, and airway secretions requiring frequent nursing assessments and interventions including titration of IV medications. POC discussed with family and India RIVERA. All are in agreement. [facility] will continue to support the patient and family.     Taty Cotton RN, [facility]  [facility] RN Liaison  [phone] (After-hours)

## 2025-01-11 NOTE — PLAN OF CARE
Took over care for patient at 0730, Patient inpatient hopsice, lethargic. At start of shift, patient was on 7 liters high flow, o2 saturation at 94%,dobhoff feedings were discontinued and dobhoff tube removed. Patient had a  oral temperature of 100.7 at  at 1144,  and given  tylenol suppository , and axilary temperature decreased to  99.0 axillary. Patient started on robinul iv, and o2 decreased to 5 liters high flow and o2 saturation  at 92% . Patient given oral care, family prefers patient to be on his back, and not turned at this time

## 2025-01-11 NOTE — DISCHARGE SUMMARY
[facility]   part of [facility]    DISCHARGE SUMMARY     Bg Watkins Patient Status:  Inpatient    10/27/1960 MRN [id number]   Location [facility] 4NW-A Attending No att. providers found   Hosp Day # 6 PCP April Simmons MD     Date of Admission: 2025  Date of Discharge: 1/10/2025  Discharge Disposition: [facility]    Discharge Diagnosis: ***    History of Present Illness: ***    Brief Synopsis: ***    Lace+ Score: 63  59-90 High Risk  29-58 Medium Risk  0-28   Low Risk.    Procedures during hospitalization:   ***    Incidental or significant findings and recommendations (brief descriptions):  ***    Lab/Test results pending at Discharge:   ***    Consultants:  ***    Discharge Medication List:     Discharge Medications        START taking these medications        Instructions Prescription details   lacosamide 10 MG/ML Soln  Commonly known as: Vimpat      10 mL (100 mg total) by Per NG Tube route 2 (two) times daily.   Quantity: 600 mL  Refills: 2            CONTINUE taking these medications        Instructions Prescription details   Microlet Lancets Misc      1 each by Other route 2 (two) times a day.   Quantity: 200 each  Refills: 3     Microlet Lancets Misc      Use to test blood sugars three times daily   Quantity: 300 each  Refills: 3     Pen Needles 31G X 6 MM Misc      1 each daily.   Quantity: 100 each  Refills: 3            ASK your doctor about these medications        Instructions Prescription details   amLODIPine 5 MG Tabs  Commonly known as: Norvasc      Take 1 tablet (5 mg total) by mouth daily.   Quantity: 90 tablet  Refills: 3     aspirin 81 MG Tbec      Take 1 tablet (81 mg total) by mouth daily.   Refills: 0     atorvastatin 80 MG Tabs  Commonly known as: Lipitor      Take 1 tablet (80 mg total) by mouth nightly.   Quantity: 90 tablet  Refills: 3     calcitriol 0.25 MCG Caps  Commonly known as: Rocaltrol      Take 1 capsule (0.25 mcg total) by mouth 3 (three)  times a week. Please take one tablet on Monday, Wednesday, and Friday.   Quantity: 30 capsule  Refills: 1     carvedilol 25 MG Tabs  Commonly known as: Coreg      Take 1 tablet (25 mg total) by mouth 2 (two) times daily.   Quantity: 90 tablet  Refills: 3     clopidogrel 75 MG Tabs  Commonly known as: Plavix      Take 1 tablet (75 mg total) by mouth daily.   Quantity: 90 tablet  Refills: 3     Contour Next Test Strp  Generic drug: Glucose Blood      Check TID   Quantity: 300 each  Refills: 3     ergocalciferol 1.25 MG (58352 UT) Caps  Commonly known as: Vitamin D2      TAKE 1 CAPSULE BY MOUTH ONCE A WEEK EVERY WEDNESDAY.   Refills: 0     hydrALAZINE 25 MG Tabs  Commonly known as: Apresoline      Take 1 tablet (25 mg total) by mouth 3 (three) times daily.   Quantity: 270 tablet  Refills: 0     indapamide 2.5 MG Tabs  Commonly known as: Lozol      Take 1 tablet (2.5 mg total) by mouth every morning.   Quantity: 90 tablet  Refills: 1     Insulin Glargine-yfgn 100 UNIT/ML Sopn  Commonly known as: Semglee (yfgn)      Inject 11 Units into the skin daily.   Quantity: 45 mL  Refills: 0     lisinopril 40 MG Tabs  Commonly known as: Prinivil; Zestril      Take 1 tablet (40 mg total) by mouth daily.   Quantity: 90 tablet  Refills: 1     melatonin 5 MG Caps      Take 5 mg by mouth nightly.   Refills: 0     pantoprazole 40 MG Tbec  Commonly known as: Protonix      Take 1 tablet (40 mg total) by mouth every morning before breakfast.   Refills: 0     senna-docusate 8.6-50 MG Tabs  Commonly known as: Senokot-S      Take 1 tablet by mouth daily.   Refills: 0     sevelamer carbonate 800 MG Tabs  Commonly known as: Renvela      TAKE 1 TABLET(800 MG) BY MOUTH THREE TIMES DAILY WITH MEALS   Quantity: 90 tablet  Refills: 1               Where to Get Your Medications        These medications were sent to [id number] - [place], [place] - [address] AT [facility], [phone], [phone]  [address]  [facility] [address]      Phone: [phone]   lacosamide 10 MG/ML Soln         [facility] reviewed: ***    Follow-up appointment:   No follow-up provider specified.  Appointments for Next 30 Days 1/11/2025 - 2/10/2025      None            Vital signs:  Temp:  [97.4 °F (36.3 °C)-102.3 °F (39.1 °C)] 98.9 °F (37.2 °C)  Pulse:  [68-92] 88  Resp:  [16-18] 18  BP: (102-135)/(33-53) 108/44  SpO2:  [90 %-94 %] 90 %    Physical Exam:    General: No acute distress.   Respiratory: Clear to auscultation bilaterally. No wheezes. No rhonchi.  Cardiovascular: S1, S2. Regular rate and rhythm. No murmurs, rubs or gallops.   Abdomen: Soft, nontender, nondistended.  Positive bowel sounds. No rebound or guarding.  Neurologic: No focal neurological deficits.   Musculoskeletal: Moves all extremities.  Extremities: No edema.  -----------------------------------------------------------------------------------------------  PATIENT DISCHARGE INSTRUCTIONS: See electronic chart    Phillip Sherwood MD 1/11/2025    Time spent:  > 30 minutes     edema.  -----------------------------------------------------------------------------------------------  PATIENT DISCHARGE INSTRUCTIONS: See electronic chart    Assessment/ Plan:      #RSV  #Acute hypoxia with mucus plugging  #Leukocytosis   #Acute metabolic encephalopathy  -CXR without infiltrates.  CT head negative.  -Supportive therapy,  nebs, bronchial hygiene.  -Will maintain patient NPO for now due to mentation.--> IR to place dobhoff as pt still not able to take PO--> dobhoff placed and pt started TF, may need to discuss peg tube if not able to take enough calories in if starts to take po--> pt would not want peg as a result transitioned to hospice   -Neuro following --> EEG--> abn signals --> vimpat started for seizure prophylaxis.  MRI Brain pending      #Hypertension  #Hyperlipidemia  #ESRD on HD  #Type 2 diabetes  -Holding home PO until mentation improves.  Discussed with bedside RN.--> resumed once dobhoff placed  -Sliding scale coverage as patient is with minimal oral intake.  -Nephrology on consult for HD management--> HD to be stopped on transition to hospice     #Fever  #Bacteremia   #PNA-aspiration   -likely due to viral infection  -check blood cx--> pos --> ID consult, abx per ID--> could be contamination, repeat cx pending--> ngtd, IV unasyn added due to hypoxia and possible infiltrate on cxr--> stop on transition to comfort care        DVT ppx: Heparin subcu  Diet: NPO   Code status: DNAR/ DNI  Disposition:  discharge to inpatient hospice     MD Fátima Davies Hospitalist  [phone]    Time spent:  > 35 minutes

## 2025-01-11 NOTE — PLAN OF CARE
Problem: GASTROINTESTINAL - ADULT  Goal: Achieves appropriate nutritional intake (bariatric)  Description: INTERVENTIONS:  - Monitor for over-consumption  - Identify factors contributing to increased intake, treat as appropriate  - Monitor I&O, WT and lab values  - Obtain nutritional consult as needed  - Evaluate psychosocial factors contributing to over-consumption  Outcome: Not Progressing     Problem: GASTROINTESTINAL - ADULT  Goal: Achieves appropriate nutritional intake (bariatric)  Description: INTERVENTIONS:  - Monitor for over-consumption  - Identify factors contributing to increased intake, treat as appropriate  - Monitor I&O, WT and lab values  - Obtain nutritional consult as needed  - Evaluate psychosocial factors contributing to over-consumption  Outcome: Not Progressing     Problem: NEUROLOGICAL - ADULT  Goal: Absence of seizures  Description: INTERVENTIONS  - Monitor for seizure activity  - Administer anti-seizure medications as ordered  - Monitor neurological status  Outcome: Progressing     Problem: NEUROLOGICAL - ADULT  Goal: Absence of seizures  Description: INTERVENTIONS  - Monitor for seizure activity  - Administer anti-seizure medications as ordered  - Monitor neurological status  Outcome: Progressing     Problem: Impaired Swallowing  Goal: Minimize aspiration risk  Description: Interventions:  - Patient should be alert and upright for all feedings (90 degrees preferred)  - Offer food and liquids at a slow rate  - No straws  - Encourage small bites of food and small sips of liquid  - Offer pills one at a time, crush or deliver with applesauce as needed  - Discontinue feeding and notify MD (or speech pathologist) if coughing or persistent throat clearing or wet/gurgly vocal quality is noted  Outcome: Progressing     Problem: Impaired Swallowing  Goal: Minimize aspiration risk  Description: Interventions:  - Patient should be alert and upright for all feedings (90 degrees preferred)  - Offer  food and liquids at a slow rate  - No straws  - Encourage small bites of food and small sips of liquid  - Offer pills one at a time, crush or deliver with applesauce as needed  - Discontinue feeding and notify MD (or speech pathologist) if coughing or persistent throat clearing or wet/gurgly vocal quality is noted  Outcome: Progressing

## 2025-01-11 NOTE — OCCUPATIONAL THERAPY NOTE
OCCUPATIONAL THERAPY EVALUATION - INPATIENT    Room Number: 412/412-A  Evaluation Date: 1/10/2025     Type of Evaluation: Initial  Presenting Problem: encephalopathy, RSV    Physician Order: IP Consult to Occupational Therapy  Reason for Therapy:  ADL/IADL Dysfunction and Discharge Planning    OCCUPATIONAL THERAPY ASSESSMENT   Patient is a 64 year old male admitted on 1/4/2025 with Presenting Problem: encephalopathy, RSV. Co-Morbidities : ESRD on HD,CVA, HTN  Patient is currently functioning near baseline with toileting, bathing, upper body dressing, lower body dressing, grooming, bed mobility, transfers, and static sitting balance.  Prior to admission, patient's baseline is mostly bedbound since October 2024.  Patient met all OT goals at max to dependent level.  Patient reports no further questions/concerns at this time.   Goals of care discussions have been initiated.    Patient will benefit from continued skilled OT Services return to long term care with restorative therapies    Recommendations for nursing staff:   Transfers: total lift  Toileting location: bed level    EVALUATION SESSION:  Patient at start of session: supine    FUNCTIONAL TRANSFER ASSESSMENT     BED MOBILITY  Supine to Sit : Dependent  Sit to Supine (OT): Dependent    BALANCE ASSESSMENT     FUNCTIONAL ADL ASSESSMENT     ACTIVITY TOLERANCE:                          O2 SATURATIONS       COGNITION  Overall Cognitive Status:  mostly non-responsive; shook head slightly when asked if he has pain  Arousal/Alertness:  inconsistent responses to stimuli and lethargic  Following Commands:  follows less than 10% of commands  Initiation: hand over hand to initiate tasks  Difficult to assess    COGNITION ASSESSMENTS     Upper Extremity:   ROM: grossly WFL with some LUE stiffness noted at shoulder  Strength: impaired/weak, could not test MMT  Coordination:  Gross motor: difficult to assess due to lack of voluntary movement  Fine motor: as above  Sensation:  difficult to assess     EDUCATION PROVIDED  Patient Education : Role of Occupational Therapy; Plan of Care; Posture/Positioning  Patient's Response to Education: Does Not Demonstrate Skills Needed for Learning; Requires Further Education    Equipment used: none  Demonstrates functional use    Therapist comments: Patent was communicated to in [place] phrases and with use of gestures. He was assisted from supine to sit with DEP assist x2 using draw sheet. Patient required DEP A for seated position , then assisted back to supine and boosted/re-positioned with DEP A .     Patient End of Session: Hospital anti-slip socks;RN aware of session/findings;Call light within reach;Needs met;In bed;Alarm set;Other (Comment) (bunny boots , UEs supported on pillows)    OCCUPATIONAL PROFILE    HOME SITUATION  Type of Home: Skilled nursing facility  Home Layout: One level  Lives With: Spouse                     Drives: No       Prior Level of Function: Was living with spouse who was his caregiver until October 2024 per EMR, then was admitted to OSH and per notes, family was seeking LTC placement at that time due to inability to care for him at home any longer. Has h/o advanced dementia and had been cared for by his family for the last 7 years. Has been mostly non-verbal for the last 5-6 years.     SUBJECTIVE      PAIN ASSESSMENT  Rating: Other (Comment) (unable to communicate; no signs of pain observed)          OBJECTIVE  Precautions: Bed/chair alarm; needed;Limb alert - right;Other (Comment) (wrist brace LUE)  Fall Risk: High fall risk    WEIGHT BEARING RESTRICTION       AM-PAC ‘6-Clicks’ Inpatient Daily Activity Short Form  -   Putting on and taking off regular lower body clothing?: Total  -   Bathing (including washing, rinsing, drying)?: Total  -   Toileting, which includes using toilet, bedpan or urinal? : Total  -   Putting on and taking off regular upper body clothing?: Total  -   Taking care of personal grooming  such as brushing teeth?: Total  -   Eating meals?: Total    AM-PAC Score:  Score: 6  Approx Degree of Impairment: 100%  Standardized Score (AM-PAC Scale): 17.07    ADDITIONAL TESTS     NEUROLOGICAL FINDINGS      PLAN   Patient has been evaluated and presents with no skilled Occupational Therapy needs at this time.  Patient discharged from Occupational Therapy services.  Please re-order if a new functional limitation presents during this admission.         Patient Evaluation Complexity Level:   Occupational Profile/Medical History MODERATE - Expanded review of history including review of medical or therapy record   Specific performance deficits impacting engagement in ADL/IADL MODERATE  3 - 5 performance deficits   Client Assessment/Performance Deficits MODERATE - Comorbidities and min to mod modifications of tasks    Clinical Decision Making LOW - Analysis of occupational profile, problem-focused assessments, limited treatment options    Overall Complexity LOW     OT Session Time: 14 minutes    Therapeutic Activity: 10 minutes

## 2025-01-11 NOTE — PLAN OF CARE
Pt is lethargic, family is at bedside, pt is tolerating tube feeding, pt is now switched to [facility] hospice.   Problem: GASTROINTESTINAL - ADULT  Goal: Achieves appropriate nutritional intake (bariatric)  Description: INTERVENTIONS:  - Monitor for over-consumption  - Identify factors contributing to increased intake, treat as appropriate  - Monitor I&O, WT and lab values  - Obtain nutritional consult as needed  - Evaluate psychosocial factors contributing to over-consumption  Outcome: Not Progressing     Problem: NEUROLOGICAL - ADULT  Goal: Absence of seizures  Description: INTERVENTIONS  - Monitor for seizure activity  - Administer anti-seizure medications as ordered  - Monitor neurological status  Outcome: Not Progressing     Problem: Impaired Swallowing  Goal: Minimize aspiration risk  Description: Interventions:  - Patient should be alert and upright for all feedings (90 degrees preferred)  - Offer food and liquids at a slow rate  - No straws  - Encourage small bites of food and small sips of liquid  - Offer pills one at a time, crush or deliver with applesauce as needed  - Discontinue feeding and notify MD (or speech pathologist) if coughing or persistent throat clearing or wet/gurgly vocal quality is noted  Outcome: Not Progressing     Problem: RESPIRATORY - ADULT  Goal: Achieves optimal ventilation and oxygenation  Description: INTERVENTIONS:  - Assess for changes in respiratory status  - Assess for changes in mentation and behavior  - Position to facilitate oxygenation and minimize respiratory effort  - Oxygen supplementation based on oxygen saturation or ABGs  - Provide Smoking Cessation handout, if applicable  - Encourage broncho-pulmonary hygiene including cough, deep breathe, Incentive Spirometry  - Assess the need for suctioning and perform as needed  - Assess and instruct to report SOB or any respiratory difficulty  - Respiratory Therapy support as indicated  - Manage/alleviate anxiety  - Monitor for  signs/symptoms of CO2 retention  Outcome: Not Progressing     Problem: SAFETY ADULT - FALL  Goal: Free from fall injury  Description: INTERVENTIONS:  - Assess pt frequently for physical needs  - Identify cognitive and physical deficits and behaviors that affect risk of falls.  - [place] fall precautions as indicated by assessment.  - Educate pt/family on patient safety including physical limitations  - Instruct pt to call for assistance with activity based on assessment  - Modify environment to reduce risk of injury  - Provide assistive devices as appropriate  - Consider OT/PT consult to assist with strengthening/mobility  - Encourage toileting schedule  Outcome: Not Progressing     Problem: DISCHARGE PLANNING  Goal: Discharge to home or other facility with appropriate resources  Description: INTERVENTIONS:  - Identify barriers to discharge w/pt and caregiver  - Include patient/family/discharge partner in discharge planning  - Arrange for needed discharge resources and transportation as appropriate  - Identify discharge learning needs (meds, wound care, etc)  - Arrange for interpreters to assist at discharge as needed  - Consider post-discharge preferences of patient/family/discharge partner  - Complete POLST form as appropriate  - Assess patient's ability to be responsible for managing their own health  - Refer to Case Management Department for coordinating discharge planning if the patient needs post-hospital services based on physician/LIP order or complex needs related to functional status, cognitive ability or social support system  Outcome: Not Progressing     Problem: Altered Communication/Language Barrier  Goal: Patient/Family is able to understand and participate in their care  Description: Interventions:  - Assess communication ability and preferred communication style  - Implement communication aides and strategies  - Use visual cues when possible  - Listen attentively, be patient, do not interrupt  -  Minimize distractions  - Allow time for understanding and response  - Establish method for patient to ask for assistance (call light)  - Provide an  as needed  - Communicate barriers and strategies to overcome with those who interact with patient  Outcome: Not Progressing

## 2025-01-11 NOTE — PLAN OF CARE
Pt GIP hospice. JM orientation dt lethargy. Family at bedside. Suctioned, repositioned, and turned appropriately. Frequent rounding. Pt had a temperature of 102.3, IV Tylenol given, fever relieved. Scopolamine patch in place. Robinul given per PRN with some relief. O2 sat above 90% 4 L. TF weaned down 10 mL q 4 hr as instructed.     0518: O2 increased to 7 L HF NC. O2 sat above 90%. Dilaudid given PRN for dyspnea.    Problem: NEUROLOGICAL - ADULT  Goal: Absence of seizures  Description: INTERVENTIONS  - Monitor for seizure activity  - Administer anti-seizure medications as ordered  - Monitor neurological status  Outcome: Progressing     Problem: Impaired Swallowing  Goal: Minimize aspiration risk  Description: Interventions:  - Patient should be alert and upright for all feedings (90 degrees preferred)  - Offer food and liquids at a slow rate  - No straws  - Encourage small bites of food and small sips of liquid  - Offer pills one at a time, crush or deliver with applesauce as needed  - Discontinue feeding and notify MD (or speech pathologist) if coughing or persistent throat clearing or wet/gurgly vocal quality is noted  Outcome: Progressing     Problem: RESPIRATORY - ADULT  Goal: Achieves optimal ventilation and oxygenation  Description: INTERVENTIONS:  - Assess for changes in respiratory status  - Assess for changes in mentation and behavior  - Position to facilitate oxygenation and minimize respiratory effort  - Oxygen supplementation based on oxygen saturation or ABGs  - Provide Smoking Cessation handout, if applicable  - Encourage broncho-pulmonary hygiene including cough, deep breathe, Incentive Spirometry  - Assess the need for suctioning and perform as needed  - Assess and instruct to report SOB or any respiratory difficulty  - Respiratory Therapy support as indicated  - Manage/alleviate anxiety  - Monitor for signs/symptoms of CO2 retention  Outcome: Progressing     Problem: SAFETY ADULT - FALL  Goal:  Free from fall injury  Description: INTERVENTIONS:  - Assess pt frequently for physical needs  - Identify cognitive and physical deficits and behaviors that affect risk of falls.  - [place] fall precautions as indicated by assessment.  - Educate pt/family on patient safety including physical limitations  - Instruct pt to call for assistance with activity based on assessment  - Modify environment to reduce risk of injury  - Provide assistive devices as appropriate  - Consider OT/PT consult to assist with strengthening/mobility  - Encourage toileting schedule  Outcome: Progressing    Problem: Altered Communication/Language Barrier  Goal: Patient/Family is able to understand and participate in their care  Description: Interventions:  - Assess communication ability and preferred communication style  - Implement communication aides and strategies  - Use visual cues when possible  - Listen attentively, be patient, do not interrupt  - Minimize distractions  - Allow time for understanding and response  - Establish method for patient to ask for assistance (call light)  - Provide an  as needed  - Communicate barriers and strategies to overcome with those who interact with patient  Outcome: Progressing

## 2025-01-11 NOTE — H&P
[facility]   part of [facility]    History and Physical     Bg Watkins Patient Status:  Inpatient    10/27/1960 MRN [id number]   Location [facility] 4NW-A Attending Duran Redding MD   Hosp Day # 1 PCP April Simmons MD     Chief Complaint: Comfort care    History of Present Illness: gB Watkins is a 64 year old male with multiple medical problems presented for ams. Pt was dx with rsv, possible aspiration pan, seizure and continued to have AMS. Patient oral intake was poor and it was decided that in accordance with the patient's wishes he would not want a peg tube so he was enrolled in hospice.     Past Medical History:  Past Medical History:    Acute pulmonary edema (HCC)    DIABETES    HTN (hypertension), benign    HYPERLIPIDEMIA    Stroke (HCC)    basil ganglia stroke    Tobacco use disorder        Past Surgical History:   Past Surgical History:   Procedure Laterality Date    Cataract Right 2018    [facility] IOL    Cataract Left 05/10/2018    [facility] IOL    Other  1990    left foot surgery       Social History:  reports that he quit smoking about 3 years ago. He has never used smokeless tobacco. He reports current alcohol use. He reports that he does not use drugs.    Family History:   Family History   Problem Relation Age of Onset    Diabetes Father     Hypertension Father     Diabetes Mother        Allergies: Allergies[1]    Medications:  Medications Ordered Prior to Encounter[2]    Review of Systems:   A comprehensive 14 point review of systems was completed.    Pertinent positives and negatives noted in the HPI.    Physical Exam:    /44   Pulse 88   Temp 98.9 °F (37.2 °C) (Axillary)   Resp 18   SpO2 90%   General: No acute distress.   Respiratory: Clear to auscultation bilaterally. No wheezes. No crackles  Cardiovascular: S1, S2. Regular rate and rhythm. No murmurs  Chest and Back: No tenderness or deformity.  Abdomen: Soft, nontender,  nondistended.  Positive bowel sounds. No rebound, guarding   Extremities: No edema of LE  Integument: No new rashes or lesions.   Psychiatric: Appropriate mood and affect.      Diagnostic Data:      Labs:  Recent Labs   Lab 25  0638 25  0703 25  0531 01/10/25  0637   WBC 11.0 10.8 8.6 7.2 9.8   HGB 9.2* 8.6* 8.7* 11.9* 8.6*   MCV 92.5 95.3 96.1 97.1 94.1   .0 174.0 183.0 185.0 178.0       Recent Labs   Lab 25  0631 25  0531 01/10/25  0637   * 137* 258*   BUN 73* 41* 74*   CREATSERUM 8.06* 4.50* 6.47*   CA 9.7 9.7 9.3   ALB 4.0 3.9 3.7    139 138   K 4.6 4.4 4.5    96* 99   CO2 24.0 25.0 25.0   ALKPHO 122* 128* 113   AST 12 16 21   ALT <7* <7* <7*   BILT 0.5 0.6 0.4   TP 7.1 7.2 6.6       CrCl cannot be calculated (Unknown ideal weight.).    No results for input(s): \"PTP\", \"INR\" in the last 168 hours.    No results for input(s): \"TROP\", \"CK\" in the last 168 hours.    Imaging: Imaging data reviewed in Epic.      ASSESSMENT / PLAN:   64 year old male with multiple medical problems presented for ams. Pt was dx with rsv, possible aspiration pan, seizure and continued to have AMS. Patient oral intake was poor and it was decided that in accordance with the patient's wishes he would not want a peg tube so he was enrolled in hospice.     -continue comfort measures and meds  -DNR/comfort  -hospice following    Dispo: no discharge    MD Fátima Davies Hospitalist  [phone]                           [1] No Known Allergies  [2]   Current Facility-Administered Medications on File Prior to Encounter   Medication Dose Route Frequency Provider Last Rate Last Admin    [COMPLETED] vancomycin (Vancocin) 2 g in sodium chloride 0.9% 500mL IVPB premix  25 mg/kg Intravenous Once Phillip Sherwood  mL/hr at 25 1701 2,000 mg at 25 1701    [] sodium chloride 0.9 % IV bolus 100 mL  100 mL Intravenous Q30 Min PRN Tete Early MD         And    [] albumin human (Albumin) 25% injection 25 g  25 g Intravenous PRN Dialysis Tete Early MD   25 g at 25 1530    [COMPLETED] ondansetron (Zofran) 4 MG/2ML injection 4 mg  4 mg Intravenous Once Frankie Hassan MD   4 mg at 25 2145    [COMPLETED] ondansetron (Zofran) 4 MG/2ML injection 2 mg  2 mg Intravenous Once Duran Redding MD   2 mg at 25 2309     Current Outpatient Medications on File Prior to Encounter   Medication Sig Dispense Refill    lacosamide 10 MG/ML Oral Solution 10 mL (100 mg total) by Per NG Tube route 2 (two) times daily. 600 mL 2    Microlet Lancets ([employer] MICROLET LANCETS) Does not apply Misc Use to test blood sugars three times daily 300 each 3    ergocalciferol 1.25 MG (03550 UT) Oral Cap TAKE 1 CAPSULE BY MOUTH ONCE A WEEK EVERY WEDNESDAY. (Patient not taking: Reported on 2025)      amLODIPine 5 MG Oral Tab Take 1 tablet (5 mg total) by mouth daily. 90 tablet 3    carvedilol 25 MG Oral Tab Take 1 tablet (25 mg total) by mouth 2 (two) times daily. 90 tablet 3    atorvastatin 80 MG Oral Tab Take 1 tablet (80 mg total) by mouth nightly. 90 tablet 3    clopidogrel 75 MG Oral Tab Take 1 tablet (75 mg total) by mouth daily. 90 tablet 3    Glucose Blood (CONTOUR NEXT TEST) In Vitro Strip Check  each 3    Insulin Pen Needle (PEN NEEDLES) 31G X 6 MM Does not apply Misc 1 each daily. 100 each 3    Microlet Lancets Does not apply Misc 1 each by Other route 2 (two) times a day. 200 each 3    lisinopril 40 MG Oral Tab Take 1 tablet (40 mg total) by mouth daily. 90 tablet 1    hydrALAZINE 25 MG Oral Tab Take 1 tablet (25 mg total) by mouth 3 (three) times daily. 270 tablet 0    calcitriol 0.25 MCG Oral Cap Take 1 capsule (0.25 mcg total) by mouth 3 (three) times a week. Please take one tablet on Monday, Wednesday, and Friday. 30 capsule 1    Insulin Glargine-yfgn (SEMGLEE, YFGN,) 100 UNIT/ML Subcutaneous Solution Pen-injector Inject 11 Units into the skin  daily. 45 mL 0    aspirin 81 MG Oral Tab EC Take 1 tablet (81 mg total) by mouth daily.      Pantoprazole Sodium 40 MG Oral Tab EC Take 1 tablet (40 mg total) by mouth every morning before breakfast.      melatonin 5 MG Oral Cap Take 5 mg by mouth nightly. (Patient not taking: Reported on 1/4/2025)      Senna-Docusate Sodium 8.6-50 MG Oral Tab Take 1 tablet by mouth daily.      SEVELAMER CARBONATE 800 MG Oral Tab TAKE 1 TABLET(800 MG) BY MOUTH THREE TIMES DAILY WITH MEALS (Patient not taking: Reported on 1/4/2025) 90 tablet 1    indapamide 2.5 MG Oral Tab Take 1 tablet (2.5 mg total) by mouth every morning. (Patient not taking: Reported on 1/4/2025) 90 tablet 1

## 2025-01-12 RX ORDER — LORAZEPAM 2 MG/ML
1 CONCENTRATE ORAL EVERY 4 HOURS PRN
Status: DISCONTINUED | OUTPATIENT
Start: 2025-01-12 | End: 2025-01-14

## 2025-01-12 RX ORDER — GLYCOPYRROLATE 0.2 MG/ML
0.4 INJECTION, SOLUTION INTRAMUSCULAR; INTRAVENOUS EVERY 6 HOURS
Status: DISCONTINUED | OUTPATIENT
Start: 2025-01-12 | End: 2025-01-14

## 2025-01-12 RX ORDER — HYDROMORPHONE HYDROCHLORIDE 1 MG/ML
2 SOLUTION ORAL EVERY 2 HOUR PRN
Status: DISCONTINUED | OUTPATIENT
Start: 2025-01-12 | End: 2025-01-14

## 2025-01-12 NOTE — HOSPICE RN NOTE
[facility] Inpatient Nursing Rounds:     Patient seen at bedside without family present. Patient opened eyes and seems to follow, would raise his right arm up when asked questions. Mouth care performed. Patient coughs even with mouth sponge used. Audible secretions/congestion. Patient open mouth breathing, currently room air, abdominal breathing, RR-24-26, shallow, facial grimacing on and off. Recommending a dose of Dilaudid d/t elevated respirations. Adding oral liquid Dilaudid and Ativan to trial. Changing the frequency of Robinul at this time.      Robinul IVP X 5, Scopolamine patch, Ofirmev x 1, Dilaudid IVP X 3 in the past 24 hours.     PPS: 20%    Patient remains eligible for general inpatient hospice care for symptom management of pain, anxiety, and airway secretions requiring frequent nursing assessments and interventions including titration of IV medications. POC discussed with family and Tiara RIVERA. All are in agreement. [facility] will continue to support the patient and family.     Taty Cotton RN,[facility]  [facility] RN Liaison  [phone] (After-hours)

## 2025-01-12 NOTE — PLAN OF CARE
Pt on hospice. Pt lethargic, MJ to assess orientation. Weaned of O2. O2 sat above 90% on RA. Frequent rounding. Pt cleaned and repositioned accordingly. Meds given per MAR. Family at bedside. Pt grimacing at neck, Dilaudid given once for pain and patient repositioned, pt appeared comfortable upon reassessment.    2144: Temp 101.1, relieved with IV Tylenol.     Problem: GASTROINTESTINAL - ADULT  Goal: Achieves appropriate nutritional intake (bariatric)  Description: INTERVENTIONS:  - Monitor for over-consumption  - Identify factors contributing to increased intake, treat as appropriate  - Monitor I&O, WT and lab values  - Obtain nutritional consult as needed  - Evaluate psychosocial factors contributing to over-consumption  Outcome: Progressing     Problem: NEUROLOGICAL - ADULT  Goal: Absence of seizures  Description: INTERVENTIONS  - Monitor for seizure activity  - Administer anti-seizure medications as ordered  - Monitor neurological status  Outcome: Progressing     Problem: Impaired Swallowing  Goal: Minimize aspiration risk  Description: Interventions:  - Patient should be alert and upright for all feedings (90 degrees preferred)  - Offer food and liquids at a slow rate  - No straws  - Encourage small bites of food and small sips of liquid  - Offer pills one at a time, crush or deliver with applesauce as needed  - Discontinue feeding and notify MD (or speech pathologist) if coughing or persistent throat clearing or wet/gurgly vocal quality is noted  Outcome: Progressing     Problem: RESPIRATORY - ADULT  Goal: Achieves optimal ventilation and oxygenation  Description: INTERVENTIONS:  - Assess for changes in respiratory status  - Assess for changes in mentation and behavior  - Position to facilitate oxygenation and minimize respiratory effort  - Oxygen supplementation based on oxygen saturation or ABGs  - Provide Smoking Cessation handout, if applicable  - Encourage broncho-pulmonary hygiene including cough, deep  breathe, Incentive Spirometry  - Assess the need for suctioning and perform as needed  - Assess and instruct to report SOB or any respiratory difficulty  - Respiratory Therapy support as indicated  - Manage/alleviate anxiety  - Monitor for signs/symptoms of CO2 retention  Outcome: Progressing     Problem: SAFETY ADULT - FALL  Goal: Free from fall injury  Description: INTERVENTIONS:  - Assess pt frequently for physical needs  - Identify cognitive and physical deficits and behaviors that affect risk of falls.  - [place] fall precautions as indicated by assessment.  - Educate pt/family on patient safety including physical limitations  - Instruct pt to call for assistance with activity based on assessment  - Modify environment to reduce risk of injury  - Provide assistive devices as appropriate  - Consider OT/PT consult to assist with strengthening/mobility  - Encourage toileting schedule  Outcome: Progressing     Problem: DISCHARGE PLANNING  Goal: Discharge to home or other facility with appropriate resources  Description: INTERVENTIONS:  - Identify barriers to discharge w/pt and caregiver  - Include patient/family/discharge partner in discharge planning  - Arrange for needed discharge resources and transportation as appropriate  - Identify discharge learning needs (meds, wound care, etc)  - Arrange for interpreters to assist at discharge as needed  - Consider post-discharge preferences of patient/family/discharge partner  - Complete POLST form as appropriate  - Assess patient's ability to be responsible for managing their own health  - Refer to Case Management Department for coordinating discharge planning if the patient needs post-hospital services based on physician/LIP order or complex needs related to functional status, cognitive ability or social support system  Outcome: Progressing     Problem: Altered Communication/Language Barrier  Goal: Patient/Family is able to understand and participate in their  care  Description: Interventions:  - Assess communication ability and preferred communication style  - Implement communication aides and strategies  - Use visual cues when possible  - Listen attentively, be patient, do not interrupt  - Minimize distractions  - Allow time for understanding and response  - Establish method for patient to ask for assistance (call light)  - Provide an  as needed  - Communicate barriers and strategies to overcome with those who interact with patient  Outcome: Progressing

## 2025-01-12 NOTE — PLAN OF CARE
Patient had two doses of prn dilaudid during shift, and o2 decreased to 4 liters high flow, family agreed patient appears very comfortable, no dilaudid drip needed at this time. Discussed with night nurse that dilaudid drip can be intiated to wean off high flow oxygen, and verblaized understanding.

## 2025-01-12 NOTE — PROGRESS NOTES
[facility]   part of [facility] Hospitalist Progress Note     Bg Watkins Patient Status:  Inpatient    10/27/1960 MRN [id number]   Location [facility] 4NW-A Attending Daniel Hutchison, DO   Hosp Day # 2 PCP April Simmons MD     Assessment & Plan:    64 year old male with multiple medical problems presented for ams. Pt was dx with rsv, possible aspiration pan, seizure and continued to have AMS. Patient oral intake was poor and it was decided that in accordance with the patient's wishes he would not want a peg tube so he was enrolled in hospice.      -continue comfort measures and meds  -DNR/comfort  -hospice following    Subjective:     Patient seen and examined this morning at bedside. Spoke w/ family at bedside, no immediate needs/questions. Pain/anxiety controlled. No acute complaints from patient.    Vital signs:  Temp:  [97.4 °F (36.3 °C)-101.1 °F (38.4 °C)] 98.9 °F (37.2 °C)  Pulse:  [79-92] 92  Resp:  [16] 16  BP: (112-142)/(53-66) 142/66  SpO2:  [91 %-98 %] 98 %    Physical Exam:    General: No acute distress.   Respiratory: Clear to auscultation bilaterally. No wheezes.  Cardiovascular: S1, S2. Regular rate and rhythm  Abdomen: Soft, nontender, nondistended.  Positive bowel sounds. No rebound or guarding.    Diagnostic Data:    Pertinent Labs and Imaging independently reviewed  Comments:  None    Daniel Hutchison D.O.  [facility] Hospitalist

## 2025-01-13 LAB — BACTERIA BLD CULT: POSITIVE

## 2025-01-13 NOTE — PLAN OF CARE
Patient on bed, lethargic. On hospice care. Afebrile. On room air. Schedules meds given as per MAR. Call light within reach. Safety precautions in place. Family at bedside. Needs anticipated and attended.       Problem: GASTROINTESTINAL - ADULT  Goal: Achieves appropriate nutritional intake (bariatric)  Description: INTERVENTIONS:  - Monitor for over-consumption  - Identify factors contributing to increased intake, treat as appropriate  - Monitor I&O, WT and lab values  - Obtain nutritional consult as needed  - Evaluate psychosocial factors contributing to over-consumption  1/13/2025 0021 by Tonja Guajardo RN  Outcome: Not Progressing  1/13/2025 0020 by Tonja Guajardo RN  Outcome: Not Progressing     Problem: NEUROLOGICAL - ADULT  Goal: Absence of seizures  Description: INTERVENTIONS  - Monitor for seizure activity  - Administer anti-seizure medications as ordered  - Monitor neurological status  1/13/2025 0021 by Tonja Guajardo RN  Outcome: Progressing  1/13/2025 0020 by Tonja Guajardo RN  Outcome: Not Progressing     Problem: Impaired Swallowing  Goal: Minimize aspiration risk  Description: Interventions:  - Patient should be alert and upright for all feedings (90 degrees preferred)  - Offer food and liquids at a slow rate  - No straws  - Encourage small bites of food and small sips of liquid  - Offer pills one at a time, crush or deliver with applesauce as needed  - Discontinue feeding and notify MD (or speech pathologist) if coughing or persistent throat clearing or wet/gurgly vocal quality is noted  1/13/2025 0021 by Tonja Guajardo RN  Outcome: Not Progressing  1/13/2025 0020 by Tonja Guajardo RN  Outcome: Not Progressing     Problem: RESPIRATORY - ADULT  Goal: Achieves optimal ventilation and oxygenation  Description: INTERVENTIONS:  - Assess for changes in respiratory status  - Assess for changes in mentation and behavior  - Position to facilitate oxygenation and minimize respiratory effort  - Oxygen  supplementation based on oxygen saturation or ABGs  - Provide Smoking Cessation handout, if applicable  - Encourage broncho-pulmonary hygiene including cough, deep breathe, Incentive Spirometry  - Assess the need for suctioning and perform as needed  - Assess and instruct to report SOB or any respiratory difficulty  - Respiratory Therapy support as indicated  - Manage/alleviate anxiety  - Monitor for signs/symptoms of CO2 retention  1/13/2025 0021 by Tonja Guajardo RN  Outcome: Not Progressing  1/13/2025 0020 by Tonja Guajardo RN  Outcome: Not Progressing     Problem: SAFETY ADULT - FALL  Goal: Free from fall injury  Description: INTERVENTIONS:  - Assess pt frequently for physical needs  - Identify cognitive and physical deficits and behaviors that affect risk of falls.  - [place] fall precautions as indicated by assessment.  - Educate pt/family on patient safety including physical limitations  - Instruct pt to call for assistance with activity based on assessment  - Modify environment to reduce risk of injury  - Provide assistive devices as appropriate  - Consider OT/PT consult to assist with strengthening/mobility  - Encourage toileting schedule  1/13/2025 0021 by Tonja Guajardo RN  Outcome: Progressing  1/13/2025 0020 by Tonja Guajardo RN  Outcome: Not Progressing     Problem: DISCHARGE PLANNING  Goal: Discharge to home or other facility with appropriate resources  Description: INTERVENTIONS:  - Identify barriers to discharge w/pt and caregiver  - Include patient/family/discharge partner in discharge planning  - Arrange for needed discharge resources and transportation as appropriate  - Identify discharge learning needs (meds, wound care, etc)  - Arrange for interpreters to assist at discharge as needed  - Consider post-discharge preferences of patient/family/discharge partner  - Complete POLST form as appropriate  - Assess patient's ability to be responsible for managing their own health  - Refer to Case  Management Department for coordinating discharge planning if the patient needs post-hospital services based on physician/LIP order or complex needs related to functional status, cognitive ability or social support system  1/13/2025 0021 by Tonja Guajardo, RN  Outcome: Not Progressing  1/13/2025 0020 by Tonja Guajardo, RN  Outcome: Not Progressing

## 2025-01-13 NOTE — CM/SW NOTE
[facility] spoke with the patient's POA Maria Del Rosario.  She does not drive and there will be nobody at the patient's home until 5pm tonight.  DME and Meds were ordered for after 5pm today.  Discharge scheduled to home for 9am tomorrow to ensure a smooth transition of care.  [facility] arranged and PCS form completed.      Mala East LCSW  [facility]  [phone]

## 2025-01-13 NOTE — PROGRESS NOTES
Provider Clarification    Additional information on the patient's respiratory status    Acute hypoxic respiratory failure likely secondary to aspiration     This note is part of the patient's medical record.

## 2025-01-13 NOTE — HOSPICE RN NOTE
[facility] GIP inpatient nursing rounds. 2 family members at bedside. Questions and concerns addressed. Patient awake, eyes open. Makes eye contact. Unable to verbalize needs. Last VS BP 98/56, HR 80, RR 28, shallow, temp 99 axillary. Breathing appears labored with RR os 28. Requested dose of PO hydromorphone 2 mg to be given now for comfort. Plan is DC home tomorrow. DME and comfort medications to be delivered to patient home tonight. Ambulance is scheduled for 9 am . Please call [facility] with any questions or concerns.    Madelyn Medellin RN, [facility]  [facility] Liaison  [phone] after hours

## 2025-01-13 NOTE — PROGRESS NOTES
Letters out   [facility]   part of [facility] Hospitalist Progress Note     Bg Watkins Patient Status:  Inpatient    10/27/1960 MRN [id number]   Location [facility] 4NW-A Attending Daniel Hutchison, DO   Hosp Day # 3 PCP April Simmons MD     Assessment & Plan:    64 year old male with multiple medical problems presented for ams. Pt was dx with rsv, possible aspiration pan, seizure and continued to have AMS. Patient oral intake was poor and it was decided that in accordance with the patient's wishes he would not want a peg tube so he was enrolled in hospice.      -continue comfort measures and meds  -DNR/comfort  -hospice following    Subjective:     Patient seen and examined this morning at bedside. No overnight events.    Vital signs:  Temp:  [99 °F (37.2 °C)-99.5 °F (37.5 °C)] 99 °F (37.2 °C)  Pulse:  [82] 82  Resp:  [22] 22  BP: (111)/(57) 111/57  SpO2:  [95 %] 95 %    Physical Exam:    General: No acute distress.   Respiratory: Clear to auscultation bilaterally. No wheezes.  Cardiovascular: S1, S2. Regular rate and rhythm  Abdomen: Soft, nontender, nondistended.  Positive bowel sounds. No rebound or guarding.    Diagnostic Data:    Pertinent Labs and Imaging independently reviewed  Comments:  None    Daniel Hutchison D.O.  [facility] Hospitalist

## 2025-01-13 NOTE — HOSPICE RN NOTE
[facility] GIP inpatient nursing visit. Several family members at bedside. Questions and concerns addressed. Patient with eyes open, makes eye contact. Last VS /57, HR 82, RR 22, temp 99 axillary. On room air. Patient has received glycopyrrolate IVP 0.4 mg x2 doses today. Required hyddromorphone 0.2 mg IVP x1, hydromorphone 2 mg PO, and lorazepam 1 mg PO in last 24 hrs for management of his severe pain and anxiety/restlessness. Remains GIP appropriate at this time for transition from IVP to PO medications for comfort. If patient stable for transfer will DC tomorrow morning to routine hospice at home. Family planning on taking care of him at home. DME has been ordered for delivery tonight along with comfort kit medications. Hospice will continue to follow this patient closely for EOL pain and symptom management and to support family. Please call [facility] with any questions or concern.    Madelyn Medellin RN, [facility]  [facility] Liaison  [phone] after hours

## 2025-01-13 NOTE — PLAN OF CARE
Problem: GASTROINTESTINAL - ADULT  Goal: Achieves appropriate nutritional intake (bariatric)  Description: INTERVENTIONS:  - Monitor for over-consumption  - Identify factors contributing to increased intake, treat as appropriate  - Monitor I&O, WT and lab values  - Obtain nutritional consult as needed  - Evaluate psychosocial factors contributing to over-consumption  Outcome: Not Progressing     Problem: NEUROLOGICAL - ADULT  Goal: Absence of seizures  Description: INTERVENTIONS  - Monitor for seizure activity  - Administer anti-seizure medications as ordered  - Monitor neurological status  Outcome: Progressing     Problem: Impaired Swallowing  Goal: Minimize aspiration risk  Description: Interventions:  - Patient should be alert and upright for all feedings (90 degrees preferred)  - Offer food and liquids at a slow rate  - No straws  - Encourage small bites of food and small sips of liquid  - Offer pills one at a time, crush or deliver with applesauce as needed  - Discontinue feeding and notify MD (or speech pathologist) if coughing or persistent throat clearing or wet/gurgly vocal quality is noted  Outcome: Not Progressing     Problem: RESPIRATORY - ADULT  Goal: Achieves optimal ventilation and oxygenation  Description: INTERVENTIONS:  - Assess for changes in respiratory status  - Assess for changes in mentation and behavior  - Position to facilitate oxygenation and minimize respiratory effort  - Oxygen supplementation based on oxygen saturation or ABGs  - Provide Smoking Cessation handout, if applicable  - Encourage broncho-pulmonary hygiene including cough, deep breathe, Incentive Spirometry  - Assess the need for suctioning and perform as needed  - Assess and instruct to report SOB or any respiratory difficulty  - Respiratory Therapy support as indicated  - Manage/alleviate anxiety  - Monitor for signs/symptoms of CO2 retention  Outcome: Progressing     Problem: SAFETY ADULT - FALL  Goal: Free from fall  injury  Description: INTERVENTIONS:  - Assess pt frequently for physical needs  - Identify cognitive and physical deficits and behaviors that affect risk of falls.  - [place] fall precautions as indicated by assessment.  - Educate pt/family on patient safety including physical limitations  - Instruct pt to call for assistance with activity based on assessment  - Modify environment to reduce risk of injury  - Provide assistive devices as appropriate  - Consider OT/PT consult to assist with strengthening/mobility  - Encourage toileting schedule  Outcome: Progressing   Continue pn hospice care,Sched and prn meds given,Scopolamine    Behind rt ear, suctioned as needed, Family at bedside,Repositioned  And made. Comfortable.

## 2025-01-13 NOTE — SPIRITUAL CARE NOTE
Brief check in. Per POA, no needs at this time. Planning to discharge home, aware new support care team will be calling to introduce themselves.

## 2025-01-14 VITALS
RESPIRATION RATE: 30 BRPM | DIASTOLIC BLOOD PRESSURE: 59 MMHG | SYSTOLIC BLOOD PRESSURE: 142 MMHG | TEMPERATURE: 100 F | HEART RATE: 102 BPM | OXYGEN SATURATION: 95 %

## 2025-01-14 NOTE — PROGRESS NOTES
NURSING DISCHARGE NOTE    Discharged Home via Ambulance.  Accompanied by Support staff  Belongings Taken by patient/family    Dc patient home in stable condition, Tylenol iv was given prior to dc,Dc paper given to support staff..

## 2025-01-14 NOTE — DISCHARGE SUMMARY
[facility] Internal Medicine Hospitalist Discharge Summary     Patient ID:  Bg Watkins  64 year old  10/27/1960    Admit date: 1/10/2025    Discharge date: 1/14/25    Attending Physician: Daniel Hutchison DO     Primary Care Physician: April Simmons MD     Discharge Diagnoses: AMS, RSV infection, Aspiration PNA, Seizures    Discharge Condition: stable    Disposition:  Home w/ Hospice    Hospital Course:      64 year old male with multiple medical problems presented for ams. Pt was dx with rsv, possible aspiration pan, seizure and continued to have AMS. Patient oral intake was poor and it was decided that in accordance with the patient's wishes he would not want a peg tube so he was enrolled in hospice.      -discharge home w/ hospice to follow    Consults: IP CONSULT TO SPIRITUAL CARE  IP CONSULT TO SOCIAL WORK    Operative Procedures:        Patient instructions:      I as the attending physician reconciled the current and discharge medications on day of discharge.     Current Discharge Medication List        CONTINUE these medications which have NOT CHANGED    Details   !! Microlet Lancets ([place] MICROLET LANCETS) Does not apply Misc Use to test blood sugars three times daily      Insulin Pen Needle (PEN NEEDLES) 31G X 6 MM Does not apply Misc 1 each daily.      !! Microlet Lancets Does not apply Misc 1 each by Other route 2 (two) times a day.       !! - Potential duplicate medications found. Please discuss with provider.        STOP taking these medications       lacosamide 10 MG/ML Oral Solution        ergocalciferol 1.25 MG (67240 UT) Oral Cap        amLODIPine 5 MG Oral Tab        carvedilol 25 MG Oral Tab        atorvastatin 80 MG Oral Tab        clopidogrel 75 MG Oral Tab        Glucose Blood (CONTOUR NEXT TEST) In Vitro Strip        lisinopril 40 MG Oral Tab        hydrALAZINE 25 MG Oral Tab        calcitriol 0.25 MCG Oral Cap        Insulin  Glargine-yfgn (SEMGLEE, YFGN,) 100 UNIT/ML Subcutaneous Solution Pen-injector        aspirin 81 MG Oral Tab EC        Pantoprazole Sodium 40 MG Oral Tab EC        melatonin 5 MG Oral Cap        Senna-Docusate Sodium 8.6-50 MG Oral Tab        SEVELAMER CARBONATE 800 MG Oral Tab        indapamide 2.5 MG Oral Tab              Activity: activity as tolerated  Diet: regular diet  Wound Care: as directed  Code Status: DNAR/Comfort Care      Exam on day of discharge:     Vitals:    01/14/25 0901   BP:    Pulse:    Resp:    Temp: 100.4 °F (38 °C)       Physical Exam:   General: no acute distress  Heart: RRR  Lungs: CTAB  Abd: Soft, NT, ND  Neuro: no focal deficits      Total time coordinating care for discharge: Greater than 30 minutes    TR [facility] Hospitalist

## 2025-01-14 NOTE — PROGRESS NOTES
Iv tylenol was given for temp of 100.4, Md and [facility] is aware of 0900 ambulance , Family at bedside.

## 2025-01-14 NOTE — HOSPICE RN NOTE
[facility] GIP inpatient nursing visit. Family at bedside. Patient with eyes closed. Last VS /59, , RR 18, temp 100.4. patient has been getting acetaminophen for elevated temp. Was as high as 103.3. he is on room air. No objective signs of distress noted. Totally dependent for all ADL's. Plan is DC home today to routine level of hospice care. Per Maria Del Rosario family is waiting to receive him at home. Symptoms appear to be managed with PO dilaudid and PO lorazepam. DME and comfort medications were delivered last night. Ambulance here now for transport. Hospice will continue to follow patient at home. Please call [facility] with any questions or concerns.    Madelyn Medellin RN, [facility]  [facility] Liaison  [phone] after hours

## 2025-01-14 NOTE — PLAN OF CARE
Pt received nonverbal, hospice. Tmax 103.3 F - tylenol & coldpacks given. RA. Tachypneic, shallow respirations, dilaudid given prn. Family at bedside.     Problem: GASTROINTESTINAL - ADULT  Goal: Achieves appropriate nutritional intake (bariatric)  Description: INTERVENTIONS:  - Monitor for over-consumption  - Identify factors contributing to increased intake, treat as appropriate  - Monitor I&O, WT and lab values  - Obtain nutritional consult as needed  - Evaluate psychosocial factors contributing to over-consumption  Outcome: Progressing     Problem: NEUROLOGICAL - ADULT  Goal: Absence of seizures  Description: INTERVENTIONS  - Monitor for seizure activity  - Administer anti-seizure medications as ordered  - Monitor neurological status  Outcome: Progressing     Problem: Impaired Swallowing  Goal: Minimize aspiration risk  Description: Interventions:  - Patient should be alert and upright for all feedings (90 degrees preferred)  - Offer food and liquids at a slow rate  - No straws  - Encourage small bites of food and small sips of liquid  - Offer pills one at a time, crush or deliver with applesauce as needed  - Discontinue feeding and notify MD (or speech pathologist) if coughing or persistent throat clearing or wet/gurgly vocal quality is noted  Outcome: Progressing     Problem: RESPIRATORY - ADULT  Goal: Achieves optimal ventilation and oxygenation  Description: INTERVENTIONS:  - Assess for changes in respiratory status  - Assess for changes in mentation and behavior  - Position to facilitate oxygenation and minimize respiratory effort  - Oxygen supplementation based on oxygen saturation or ABGs  - Provide Smoking Cessation handout, if applicable  - Encourage broncho-pulmonary hygiene including cough, deep breathe, Incentive Spirometry  - Assess the need for suctioning and perform as needed  - Assess and instruct to report SOB or any respiratory difficulty  - Respiratory Therapy support as indicated  -  Manage/alleviate anxiety  - Monitor for signs/symptoms of CO2 retention  Outcome: Progressing     Problem: SAFETY ADULT - FALL  Goal: Free from fall injury  Description: INTERVENTIONS:  - Assess pt frequently for physical needs  - Identify cognitive and physical deficits and behaviors that affect risk of falls.  - [place] fall precautions as indicated by assessment.  - Educate pt/family on patient safety including physical limitations  - Instruct pt to call for assistance with activity based on assessment  - Modify environment to reduce risk of injury  - Provide assistive devices as appropriate  - Consider OT/PT consult to assist with strengthening/mobility  - Encourage toileting schedule  Outcome: Progressing

## 2025-01-14 NOTE — PLAN OF CARE
Assumed care of pt at 0700.  Comfort/hospice care.  Pt drowsy but arousable.  Nonverbal.  Lungs diminished bilaterally.  On rom air, O2 at 93%, congested nonproductive cough.  Oral suction as needed.  Family at bedside on day shift.  Plans for discharge home tomorrow and will begin hospice at home.  Family updated of above POC.  Verbalizes understanding.  Repositioned for comfort.  Meds given per MAR.  Needs addressed.  Isolation and safety measures in place.     Problem: GASTROINTESTINAL - ADULT  Goal: Achieves appropriate nutritional intake (bariatric)  Description: INTERVENTIONS:  - Monitor for over-consumption  - Identify factors to increased intake, treat as appropriate  - Monitor I&O, WT and lab values  - Obtain nutritional consult as needed  - Evaluate psychosocial factors contributing to over-consumption  Outcome: Not Progressing     Problem: NEUROLOGICAL - ADULT  Goal: Absence of seizures  Description: INTERVENTIONS  - Monitor for seizure activity  - Administer anti-seizure medications as ordered  - Monitor neurological status  Outcome: Progressing     Problem: Impaired Swallowing  Goal: Minimize aspiration risk  Description: Interventions:  - Patient should be alert and upright for all feedings (90 degrees preferred)  - Offer food and liquids at a slow rate  - No straws  - Encourage small bites of food and small sips of liquid  - Offer pills one at a time, crush or deliver with applesauce as needed  - Discontinue feeding and notify MD (or speech pathologist) if coughing or persistent throat clearing or wet/gurgly vocal quality is noted  Outcome: Progressing     Problem: RESPIRATORY - ADULT  Goal: Achieves optimal ventilation and oxygenation  Description: INTERVENTIONS:  - Assess for changes in respiratory status  - Assess for changes in mentation and behavior  - Position to facilitate oxygenation and minimize respiratory effort  - Oxygen supplementation based on oxygen saturation or ABGs  - Provide Smoking  Cessation handout, if applicable  - Encourage broncho-pulmonary hygiene including cough, deep breathe, Incentive Spirometry  - Assess the need for suctioning and perform as needed  - Assess and instruct to report SOB or any respiratory difficulty  - Respiratory Therapy support as indicated  - Manage/alleviate anxiety  - Monitor for signs/symptoms of CO2 retention  Outcome: Progressing     Problem: SAFETY ADULT - FALL  Goal: Free from fall injury  Description: INTERVENTIONS:  - Assess pt frequently for physical needs  - Identify cognitive and physical deficits and behaviors that affect risk of falls.  - [place] fall precautions as indicated by assessment.  - Educate pt/family on patient safety including physical limitations  - Instruct pt to call for assistance with activity based on assessment  - Modify environment to reduce risk of injury  - Provide assistive devices as appropriate  - Consider OT/PT consult to assist with strengthening/mobility  - Encourage toileting schedule  Outcome: Progressing